# Patient Record
Sex: MALE | Race: WHITE | Employment: OTHER | URBAN - METROPOLITAN AREA
[De-identification: names, ages, dates, MRNs, and addresses within clinical notes are randomized per-mention and may not be internally consistent; named-entity substitution may affect disease eponyms.]

---

## 2021-04-14 ENCOUNTER — HOSPITAL ENCOUNTER (INPATIENT)
Age: 21
LOS: 14 days | Discharge: HOME | DRG: 881 | End: 2021-04-28
Attending: PSYCHIATRY & NEUROLOGY | Admitting: PSYCHIATRY & NEUROLOGY
Payer: OTHER GOVERNMENT

## 2021-04-14 DIAGNOSIS — R45.851 SUICIDAL IDEATION: Primary | ICD-10-CM

## 2021-04-14 PROBLEM — F32.9 MAJOR DEPRESSIVE DISORDER: Status: ACTIVE | Noted: 2021-04-14

## 2021-04-14 LAB
ALBUMIN SERPL-MCNC: 4 G/DL (ref 3.5–5)
ALBUMIN/GLOB SERPL: 1.2 {RATIO} (ref 1.1–2.2)
ALP SERPL-CCNC: 68 U/L (ref 45–117)
ALT SERPL-CCNC: 30 U/L (ref 12–78)
AMPHET UR QL SCN: NEGATIVE
ANION GAP SERPL CALC-SCNC: 4 MMOL/L (ref 5–15)
APPEARANCE UR: CLEAR
AST SERPL W P-5'-P-CCNC: 17 U/L (ref 15–37)
BACTERIA URNS QL MICRO: NEGATIVE /HPF
BARBITURATES UR QL SCN: NEGATIVE
BASOPHILS # BLD: 0 K/UL (ref 0–0.1)
BASOPHILS NFR BLD: 0 % (ref 0–1)
BENZODIAZ UR QL: NEGATIVE
BILIRUB SERPL-MCNC: 0.3 MG/DL (ref 0.2–1)
BILIRUB UR QL: NEGATIVE
BUN SERPL-MCNC: 12 MG/DL (ref 6–20)
BUN/CREAT SERPL: 18 (ref 12–20)
CA-I BLD-MCNC: 8.8 MG/DL (ref 8.5–10.1)
CANNABINOIDS UR QL SCN: NEGATIVE
CHLORIDE SERPL-SCNC: 108 MMOL/L (ref 97–108)
CO2 SERPL-SCNC: 28 MMOL/L (ref 21–32)
COCAINE UR QL SCN: NEGATIVE
COLOR UR: NORMAL
COVID-19 RAPID TEST, COVR: NOT DETECTED
CREAT SERPL-MCNC: 0.68 MG/DL (ref 0.7–1.3)
DIFFERENTIAL METHOD BLD: ABNORMAL
DRUG SCRN COMMENT,DRGCM: NORMAL
EOSINOPHIL # BLD: 0.1 K/UL (ref 0–0.4)
EOSINOPHIL NFR BLD: 1 % (ref 0–7)
ERYTHROCYTE [DISTWIDTH] IN BLOOD BY AUTOMATED COUNT: 13.8 % (ref 11.5–14.5)
ETHANOL SERPL-MCNC: <4 MG/DL
GLOBULIN SER CALC-MCNC: 3.4 G/DL (ref 2–4)
GLUCOSE SERPL-MCNC: 87 MG/DL (ref 65–100)
GLUCOSE UR STRIP.AUTO-MCNC: NEGATIVE MG/DL
HCT VFR BLD AUTO: 35.8 % (ref 36.6–50.3)
HGB BLD-MCNC: 11.6 G/DL (ref 12.1–17)
HGB UR QL STRIP: NEGATIVE
IMM GRANULOCYTES # BLD AUTO: 0 K/UL (ref 0–0.04)
IMM GRANULOCYTES NFR BLD AUTO: 0 % (ref 0–0.5)
KETONES UR QL STRIP.AUTO: NEGATIVE MG/DL
LEUKOCYTE ESTERASE UR QL STRIP.AUTO: NEGATIVE
LYMPHOCYTES # BLD: 1.5 K/UL (ref 0.8–3.5)
LYMPHOCYTES NFR BLD: 25 % (ref 12–49)
MCH RBC QN AUTO: 26.7 PG (ref 26–34)
MCHC RBC AUTO-ENTMCNC: 32.4 G/DL (ref 30–36.5)
MCV RBC AUTO: 82.5 FL (ref 80–99)
METHADONE UR QL: NEGATIVE
MONOCYTES # BLD: 0.4 K/UL (ref 0–1)
MONOCYTES NFR BLD: 7 % (ref 5–13)
NEUTS SEG # BLD: 4.2 K/UL (ref 1.8–8)
NEUTS SEG NFR BLD: 67 % (ref 32–75)
NITRITE UR QL STRIP.AUTO: NEGATIVE
OPIATES UR QL: NEGATIVE
PCP UR QL: NEGATIVE
PH UR STRIP: 7 [PH] (ref 5–8)
PLATELET # BLD AUTO: 212 K/UL (ref 150–400)
PMV BLD AUTO: 10.1 FL (ref 8.9–12.9)
POTASSIUM SERPL-SCNC: 3.7 MMOL/L (ref 3.5–5.1)
PROT SERPL-MCNC: 7.4 G/DL (ref 6.4–8.2)
PROT UR STRIP-MCNC: NEGATIVE MG/DL
RBC # BLD AUTO: 4.34 M/UL (ref 4.1–5.7)
RBC #/AREA URNS HPF: NORMAL /HPF (ref 0–5)
SODIUM SERPL-SCNC: 140 MMOL/L (ref 136–145)
SP GR UR REFRACTOMETRY: 1.01 (ref 1–1.03)
SPECIMEN SOURCE: NORMAL
UA: UC IF INDICATED,UAUC: NORMAL
UROBILINOGEN UR QL STRIP.AUTO: 0.1 EU/DL (ref 0.1–1)
WBC # BLD AUTO: 6.3 K/UL (ref 4.1–11.1)
WBC URNS QL MICRO: NORMAL /HPF (ref 0–4)

## 2021-04-14 PROCEDURE — 65270000029 HC RM PRIVATE

## 2021-04-14 PROCEDURE — 74011250637 HC RX REV CODE- 250/637: Performed by: PSYCHIATRY & NEUROLOGY

## 2021-04-14 PROCEDURE — 81001 URINALYSIS AUTO W/SCOPE: CPT

## 2021-04-14 PROCEDURE — 36415 COLL VENOUS BLD VENIPUNCTURE: CPT

## 2021-04-14 PROCEDURE — 80307 DRUG TEST PRSMV CHEM ANLYZR: CPT

## 2021-04-14 PROCEDURE — 99285 EMERGENCY DEPT VISIT HI MDM: CPT

## 2021-04-14 PROCEDURE — 87635 SARS-COV-2 COVID-19 AMP PRB: CPT

## 2021-04-14 PROCEDURE — 82077 ASSAY SPEC XCP UR&BREATH IA: CPT

## 2021-04-14 PROCEDURE — 80053 COMPREHEN METABOLIC PANEL: CPT

## 2021-04-14 PROCEDURE — 85025 COMPLETE CBC W/AUTO DIFF WBC: CPT

## 2021-04-14 RX ORDER — ACETAMINOPHEN 325 MG/1
650 TABLET ORAL
Status: DISCONTINUED | OUTPATIENT
Start: 2021-04-14 | End: 2021-04-28 | Stop reason: HOSPADM

## 2021-04-14 RX ORDER — ADHESIVE BANDAGE
30 BANDAGE TOPICAL DAILY PRN
Status: DISCONTINUED | OUTPATIENT
Start: 2021-04-14 | End: 2021-04-28 | Stop reason: HOSPADM

## 2021-04-14 RX ORDER — HYDROXYZINE 50 MG/1
50 TABLET, FILM COATED ORAL
Status: DISCONTINUED | OUTPATIENT
Start: 2021-04-14 | End: 2021-04-19

## 2021-04-14 RX ORDER — TRAZODONE HYDROCHLORIDE 50 MG/1
50 TABLET ORAL
Status: DISCONTINUED | OUTPATIENT
Start: 2021-04-14 | End: 2021-04-19

## 2021-04-14 RX ADMIN — HYDROXYZINE HYDROCHLORIDE 50 MG: 50 TABLET, FILM COATED ORAL at 20:51

## 2021-04-14 RX ADMIN — TRAZODONE HYDROCHLORIDE 50 MG: 50 TABLET ORAL at 20:51

## 2021-04-14 NOTE — BH NOTES
This 24 year old caucasin male is being admitted to [address] [place] [address] under the professional sertvices of Aiden Chavez with an admitting diagnosis of major Depression with suicidal ideations. 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 Best he could rate sleep hours was about a three hours. Verbalizes that he went to his platXoomsysant and told him he was feeling suicidal with a plan to hang self and was bought to our ER. Feels paronoid and has racing thoughts at times. Denies any substance abuse issues. Reports that he was feeling suicidal this morning but not right now. Client will be plaed on close observation for safety.
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
## 2021-04-14 NOTE — ROUTINE PROCESS
TRANSFER - OUT REPORT: 
 
Verbal report given to Antonio Mesa [id number] (name) on Thelma Elkins  being transferred to [address] [place] [address] (unit) for routine progression of care Report consisted of patients Situation, Background, Assessment and  
Recommendations(SBAR). Information from the following report(s) SBAR, ED Summary, MAR, Recent Results and Med Rec Status was reviewed with the receiving nurse. Lines:    
 
Opportunity for questions and clarification was provided. Patient transported with: 
 [employer]
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
## 2021-04-14 NOTE — ED TRIAGE NOTES
Pt states he attempted to hang himself 2 days pta and told his platoon sgt who brought him in today, pt reports current SI, denies HI, denies drug/alcohol use

## 2021-04-14 NOTE — BSMART NOTE
1150 [facility] [address] INTAKE ASSESSMENT Pt awake alert sitting up in the chair watching TV. Pt states he is feeling better denies SI HI Hallucinations at this time. BED SEARCH [facility] No male beds [facility] faxed [facility] N/A message left [facility] can not accommodate pt at this time

## 2021-04-15 PROCEDURE — 65220000003 HC RM SEMIPRIVATE PSYCH

## 2021-04-15 PROCEDURE — 74011250637 HC RX REV CODE- 250/637: Performed by: PSYCHIATRY & NEUROLOGY

## 2021-04-15 RX ADMIN — TRAZODONE HYDROCHLORIDE 50 MG: 50 TABLET ORAL at 21:05

## 2021-04-15 NOTE — BH NOTES
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 Pt reported depression and suicidal thoughts with plan to hang himself. Pt expressed anxiety and paranoia as well. Pt stated his reasons for depression and SI is due to the likelihood of him being dismissed from the [employer]. Pt stated that the doctor at [facility] doesn't believe he is fit for duty, and pt stated \"and I agree\". Pt stated the possibility of this taking place has \"put a lot of stress on me\" because he is unsure if it will be a medical discharge or not. 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 Do emotions interfere with leisure activities / lifestyles:  Yes    When engaging in leisure activities, do you forget worries:  Sometimes    Do you belong to a Anabaptist:  No    Are you active in CENTERSONIC activities:  No    Typical Day:  Pt reported he wakes up, does pt, cleans his room, eats, and has his free time.      Strengths:  Finances, Employment, Housing and Providers    Limitations / Barriers:  Family Support, Social Support, Energy, Motivation, Depressed Mood, Anxiety, Sleep, Concentration, Paranoia and Anger / Aggression    Treatment Modalities:  Stress Management, Coping Skills, Symptom Recognition, Healthy Thinking, Mood Management and Leisure Skills    Patient Educational  Needs:  Skills to recognize and challenge problematic thinking, Identify positive coping strategies and skills to manage symptoms or moods, Leisure education and Recognition of symptoms and signs    Focus of Treatment:  Introduce positive outlets for self expression and Introduce and encourage the exploration of alternative coping skills    Summary:  Pt was cooperative during assessment. Pt reported [place] [facility]. Melanie Bauer. Pt reported his most recent hospital stay was at [facility], where he states he just left from. Pt was presenting flat, depressed, very soft spoken and took a few seconds before answering each question, but was forthcoming with information. Pt reported he sees a psychiatrist, Dr. Martell Johnson at [facility]. Pt stated his goal for treatment is \"being better at coping skills\".
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
## 2021-04-15 NOTE — PROGRESS NOTES
Problem: Depressed Mood (Adult/Pediatric)  Goal: *STG: Remains safe in hospital  Outcome: Progressing Towards Goal  Goal: *STG: Complies with medication therapy  Outcome: Progressing Towards Goal     Problem: Suicide  Goal: *STG: Remains safe in hospital  Outcome: Progressing Towards Goal  Goal: *STG/LTG: Complies with medication therapy  Outcome: Progressing Towards Goal     Problem: Depressed Mood (Adult/Pediatric)  Goal: *STG: Attends activities and groups  Outcome: Not Progressing Towards Goal     Problem: Suicide  Goal: *STG: Seeks staff when feelings of self harm or harm towards others arise  Outcome: Not Progressing Towards Goal

## 2021-04-15 NOTE — BH NOTES
Behavioral Health Treatment Team Note     Patient goal(s) for today: declined to answer  Treatment team focus/goals: PSA, medication management, group therapy, collateral    Progress note: Pt laying in bed, difficulty staying awake, answering questions keeping eyes open. Pt reported he has not been able to sleep and declined PSA at this time. Writer will continue to attempt PSA.      LOS:  1  Expected LOS: 5-7    Insurance info/prescription coverage:  [facility]  Date of last family contact:  None at this time  Family requesting physician contact today:  no  Discharge plan:  Writer will address DC plans with pt after PSA completed  Guns in the home:  unknown   Outpatient provider(s):  Possibly Iva Coughlin, will determine when PSA is completed    Participating treatment team members: Aysha Perera, * (assigned SW), Reji Bearden LMSW

## 2021-04-15 NOTE — GROUP NOTE
MICHELL  GROUP DOCUMENTATION INDIVIDUAL Group Therapy Note Date: 4/15/2021 Group Start Time: 1500 Group End Time: 9242 Group Topic: Process Group - [facility] Juan Carlos GALARZA  GROUP DOCUMENTATION GROUP Group Therapy Note Attendees: All pts were encouraged to attend however only 5 out of 13 attended. The pts were asked to introduce themselves and then to state something positive that has happened to them recently. We then discussed coping mechanisms and passed around the positivity ball. Attendance: Did not attend Ewelina Maurice

## 2021-04-15 NOTE — BH NOTES
PSA ATTEMPT      Writer introduced herself as pt's  and therapist. Pt laying in bed while speaking, difficulty staying awak, answering questions and keeping eyes open. Pt reported he has not been able to sleep. Writer asked if pt felt up to answering some questions and pt requested writer return at later time. Writer will continue to f/u as needed.

## 2021-04-15 NOTE — BH NOTES
Patient presents with a flat affect. Quiet on the unit. Pleasant & cooperative. Attends groups. Stated that he wants out of the [employer] & wants to return home. Presently sitting in the dayroom with peers. Denies SI/HI. Denies AVH. No physical complaints voiced. Remains on CO. Continue to monitor.

## 2021-04-15 NOTE — GROUP NOTE
[facility] GROUP DOCUMENTATION INDIVIDUAL Group Therapy Note Date: 4/15/2021 Group Start Time: 1100 Group End Time: 2594 Group Topic: Education Group - Inpatient [facility] Group [facility] GROUP DOCUMENTATION GROUP Group Therapy Note Attendees: All pts were encouraged to come but only 9 out of 13 pts attended. The topic was trauma and the pts were given two worksheets revolving around trauma and common reactions to trauma. The pts were asked to read aloud and to comment on what they related to. The conversations revolved around trauma reactions and coping and how ppl expect you to just get over it as well as the importance of talking about it instead of ignoring it. They were given a trigger warning at the beginning and in the end it was offered to stick around for breathing exercises. Attendance: Attended Patient's Goal:  To attend groups and activities Interventions/techniques: Challenged, Informed, Validated, Promoted peer support, Provide feedback and Supported Follows Directions: Followed directions Interactions: Interacted appropriately Mental Status: Calm and Congruent Behavior/appearance: Attentive, Cooperative and Withdrawn/quiet Goals Achieved: Able to listen to others Additional Notes:  Pt came in late and did not participate but listened Tawana Lawrence

## 2021-04-15 NOTE — BH NOTES
Pt very withdrawn, stays in bed all shift. Affect is flat, no emotion noted. Pt barely answers questions. He was med compliant. He was given trazodone to help sleeping as his complaints were that he had not been sleeping well due to nightmares an anxiety. Pt talks very softly and difficult to understand. Ice water was given and snack was offered but patient refused food. Pt has been sleeping all night with no distress noted. Respirations have been regular and unlabored. Will continue to monitor patient every 15 mins as per unit.

## 2021-04-16 PROCEDURE — 65220000003 HC RM SEMIPRIVATE PSYCH

## 2021-04-16 PROCEDURE — 74011250637 HC RX REV CODE- 250/637: Performed by: PSYCHIATRY & NEUROLOGY

## 2021-04-16 RX ORDER — BUPROPION HYDROCHLORIDE 75 MG/1
75 TABLET ORAL DAILY
Status: DISCONTINUED | OUTPATIENT
Start: 2021-04-17 | End: 2021-04-18

## 2021-04-16 RX ADMIN — TRAZODONE HYDROCHLORIDE 50 MG: 50 TABLET ORAL at 21:48

## 2021-04-16 RX ADMIN — HYDROXYZINE HYDROCHLORIDE 50 MG: 50 TABLET, FILM COATED ORAL at 14:22

## 2021-04-16 NOTE — GROUP NOTE
[facility] GROUP DOCUMENTATION INDIVIDUAL Group Therapy Note Date: 4/16/2021 Group Start Time: 1100 Group End Time: 3109 Group Topic: Education Group - Inpatient [facility] Group Malachi [place] [facility] GROUP DOCUMENTATION GROUP Group Therapy Note Attendees: All pts were encouraged to attend but only 10 out of 14 came. The topic was deep breathing. As a group we read through the worksheet and talked about what deep breathing was and what its benefits are. We then practiced deep breathing as a group and the pts discussed how they felt afterwards. Attendance: Attended Patient's Goal:  To attend groups and activities Interventions/techniques: Challenged, Informed, Validated, Promoted peer support, Provide feedback and Supported Follows Directions: Followed directions Interactions: Interacted appropriately Mental Status: Calm, Congruent and Flat Behavior/appearance: Attentive, Cooperative and Withdrawn/quiet Goals Achieved: Able to listen to others and Able to self-disclose Additional Notes:  Pt did not participate much. He reported feeling anxious and that when he practiced deep breathing in the past it did not always work for him. Lou Juan F

## 2021-04-16 NOTE — PROGRESS NOTES
Spiritual Care Assessment/Progress Note  [facility]      NAME: Eva Bazan      MRN: [id number]  AGE: 24 y.o.  SEX: male  Restorationist Affiliation: No preference   Language: English     4/16/2021     Total Time (in minutes): 20     Spiritual Assessment begun in [facility] 2  NON ACUTE through conversation with:         [x]Patient        [] Family    [] Friend(s)        Reason for Consult: Request by patient     Spiritual beliefs: (Please include comment if needed)     [] Identifies with a elzbieta tradition:         [] Supported by a elzbieta community:            [x] Claims no spiritual orientation:           [] Seeking spiritual identity:                [] Adheres to an individual form of spirituality:           [] Not able to assess:                           Identified resources for coping:      [] Prayer                               [] Music                  [] Guided Imagery     [] Family/friends                 [] Pet visits     [] Devotional reading                         [x] Unknown     [] Other:                                             Interventions offered during this visit: (See comments for more details)    Patient Interventions: Affirmation of emotions/emotional suffering, Catharsis/review of pertinent events in supportive environment, Coping skills reviewed/reinforced, Initial/Spiritual assessment, patient floor, Normalization of emotional/spiritual concerns           Plan of Care:     [] Support spiritual and/or cultural needs    [] Support AMD and/or advance care planning process      [] Support grieving process   [] Coordinate Rites and/or Rituals    [] Coordination with community clergy   [] No spiritual needs identified at this time   [] Detailed Plan of Care below (See Comments)  [] Make referral to Music Therapy  [] Make referral to Pet Therapy     [] Make referral to Addiction services  [] Make referral to [facility]  [] Make referral to Spiritual Care Partner  [] No future visits requested        [x] Follow up upon further referrals     Comments:  responded to patient request for visit.  and patient engaged in conversation re patient's suicide attempts, his unhappiness in the [facility], and his sense of being unhappy and lost. Patient and  engaged in patient's concerns re God's forgiveness.  assured patient of God's caring about him and laura and forgiveness is God's business.  and patient also reflected on coping skills, patient indicated he cannot always use his coping skills and gave video games as one example of a coping skill. Pt is hopeful to get out of the [facility].  and patient explored hopes and what may bring patient happiness/contentment. Patient indicated he wants to help people and that is why he wanted to be a  in [facility] but he was rejected.  encouraged patient to not give up on his goal of finding a purpose and vocation in life that will provided contentment. Patient expressed thankfulness for visit.  advised patient of chaplains availability for follow up upon further referrals.      Visited by Heidi Aragon, [address], [facility]    can be contacted by calling  and requesting  on call

## 2021-04-16 NOTE — BH NOTES
PSYCHOSOCIAL ASSESSMENT  :Patient identifying info:   Shellie Bradley is a 24 y.o., male admitted 4/14/2021 11:37 AM     Presenting problem and precipitating factors:   Pt stated that he has been suicidal and depressed since middle school. He has attempted suicide in middle school and high school and the last attempt in high school was the closest he's even got to succeeding. 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 He wanted to join the [employer] but didn't pass the test. He now wants out and thinks the [employer] is making him worse. Prior to joining [employer] he did 3 [employer]. He feels like he has failed and made the wrong move. He feels like the real him is trapped inside and can't get out. He stated that at first he was ok in the [employer] and then he kept getting into fights and blacked out during one and had a flashback of a previous attempt and then it started up again. He reported being on trazedone and an anti depressant that he couldn't name that he believes put him in depressive episodes and led to the last attempt. He left [facility] twoThursdays ago and then attempted suicide on Monday and told his medical officer a [facility] and she got him here. He reported that his safety plan helped hm with his last attempt. He also stated that he couldn't find a place to hang himself so he gave up. He rated his current CI an 8/10 but contracted for safety. Writer let the nurses know. He stated that he currently has no plan while at hospital but would if he were home. He also reported having a dream last night that he jumped off a bridge. He reported thinking about suicide at least once or twice a day. Mental status assessment:   He presented w a flat affect. He did not appear to be responding to internal stimuli. He was tearful when talking about his suicide attempts. He was soft spoken. His speech and thought process and content was organized.      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 Collateral information:   Pt declined bc he does not want them knowing what is going on with him     Current psychiatric /substance abuse providers and contact info:   Pt reported seeing medical officer Rasheeda Phan at [facility]    Previous psychiatric/substance abuse providers and response to treatment:   Pt reported [facility] at [facility]     Family history of mental illness or substance abuse:   Pt reported none that he is aware of    Substance abuse history:  Pt reported none       Social History     Tobacco Use    Smoking status: Never Smoker   Substance Use Topics    Alcohol use: Not on file       History of biomedical complications associated with substance abuse :  Pt reported none     Patient's current acceptance of treatment or motivation for change:  Pt seems motivated to get out of [employer] and wants to figure out why he keeps having SI    Family constellation:   Pt reported having a mom and dad back in 1983 [facility]. He said he has not been very close to them the past 6 months bc he hasn't seen them and tends to ignore them when they reach out    Is significant other involved?    Pt reported being single     Describe support system:   Pt reported talking to his cousin who lives in [place] and who was in the [employer] for 28 years, but they have never met in person    Describe living arrangements and home environment:  [place] [facility] Group issues:   Pt reported none     Hospital Problems  Never Reviewed          Codes Class Noted POA    Suicidal ideation ICD-10-CM: R45.851  ICD-9-CM: V62.84  4/14/2021 Unknown        Major depressive disorder ICD-10-CM: F32.9  ICD-9-CM: 296.20  4/14/2021 Unknown              Trauma history:   Pt reported being bullied a lot to the point of having SI    Legal issues:   Pt reported none    History of  service:   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 He reported abandoning all his friends and the ones [place] base he does not talk to about his problems.  However, they did find out [facility]    Describe coping skills:  Pt reported playing games     [email or web address]  2021

## 2021-04-16 NOTE — GROUP NOTE
[facility] GROUP DOCUMENTATION INDIVIDUAL Group Therapy Note Date: 4/16/2021 Group Start Time: 1500 Group End Time: 7863 Group Topic: Process Group - Inpatient [facility] Group Shelba Rater [facility] GROUP DOCUMENTATION GROUP Group Therapy Note Attendees: All pts were encouraged to attend but only 5 out of 10 came. All pts were asked to introduce themselves and to state something positive that has happened recently. We then passed around the anger ball and focused on anger management. Everyone stated they enjoyed the activity. Attendance: Attended Patient's Goal:  To attend groups and activities Interventions/techniques: Challenged, Informed, Validated, Promoted peer support, Provide feedback and Supported Follows Directions: Followed directions Interactions: Interacted appropriately Mental Status: Calm, Congruent and Flat Behavior/appearance: Attentive and Cooperative Goals Achieved: Able to engage in interactions, Able to listen to others and Able to self-disclose Additional Notes:  Pt came in late. He talked about how weather affects your mood and the color red reminding us of anger and being threatened by someone and reacting poorly. Heaven Lock

## 2021-04-16 NOTE — BH NOTES
Dx: Depression w/SI    Affect restricted to flat; however, denied having thoughts to do self harm. Poor eye to eye contact. Agreed to let staff know if he has SI. Cont to say he would like to get out of the [facility]. Currently not on scheduled meds. Minimal interactions. Attended to hygiene and dressed appropriately. Encouraged to attend groups. Q 15 mins checks maintained, for safety. 1424 Vistaril 50 mg, given p o, for anxiety level of 8/10. Said he was tearful during his assessment with the CM. Agreed to let staff know if he has thoughts to self harm. Q 15 mins checks maintained. Attended some groups.

## 2021-04-16 NOTE — BH NOTES
FIREARM ASSESSMENT    Pt denied access to firearms. He stated that he used to have access during training.

## 2021-04-16 NOTE — PROGRESS NOTES
Problem: Depressed Mood (Adult/Pediatric)  Goal: *STG: Verbalizes anger, guilt, and other feelings in a constructive manor  Outcome: Progressing Towards Goal     Problem: Depressed Mood (Adult/Pediatric)  Goal: *STG: Remains safe in hospital  Outcome: Progressing Towards Goal     Problem: Depressed Mood (Adult/Pediatric)  Goal: *STG: Complies with medication therapy  Outcome: Progressing Towards Goal     Patient was able to express his feelings. Patient has remained safe so far this shift. Patient was medication compliant. Patient remains on close observation. Patient has been alert, oriented, cooperative and pleasant on approach. Patient has been slightly withdrawn. He has a flat affect. Patient has admitted to depression. Patient has not voiced any SI or HI. Medication compliant with PRN Trazodone. Continue to assess. Since going to bed, patient has been laying down quietly with his eyes closed.

## 2021-04-17 PROCEDURE — 74011250637 HC RX REV CODE- 250/637: Performed by: PSYCHIATRY & NEUROLOGY

## 2021-04-17 PROCEDURE — 65220000003 HC RM SEMIPRIVATE PSYCH

## 2021-04-17 RX ADMIN — TRAZODONE HYDROCHLORIDE 50 MG: 50 TABLET ORAL at 21:31

## 2021-04-17 RX ADMIN — BUPROPION HYDROCHLORIDE 75 MG: 75 TABLET, FILM COATED ORAL at 09:06

## 2021-04-17 RX ADMIN — HYDROXYZINE HYDROCHLORIDE 50 MG: 50 TABLET, FILM COATED ORAL at 23:42

## 2021-04-17 NOTE — PROGRESS NOTES
Progress Note  Date:2021       Room:[facility]  Patient Name:Guy Poole     YOB: 2000     Age:20 y.o. Subjective    Subjective  Patient reports he feels the same from the previous day. He reports still depressed having suicidal ideations. He states he stresses relating to debating whether he is going to stay in the [employer] or not. He presents with restricted affect. She denied any active intent or plan to harm himself today. Denies any hallucinations. He was able to share briefly but remains to himself. Mental status examination-patient is alert oriented to name place person, restricted affect quiet reports feeling depressed suicidal but no plan or intent. He states that he feels the same from previous day. But is able to process and share briefly. Insight judgment coping remains limited  Review of Systems  Objective         Vitals Last 24 Hours:  TEMPERATURE:  Temp  Av.7 °F (37.1 °C)  Min: 98.7 °F (37.1 °C)  Max: 98.7 °F (37.1 °C)  RESPIRATIONS RANGE: Resp  Av  Min: 16  Max: 16  PULSE OXIMETRY RANGE: No data recorded  PULSE RANGE: Pulse  Av  Min: 67  Max: 67  BLOOD PRESSURE RANGE: Systolic (67VRJ), BZZ:659 , Min:107 , MYW:244   ; Diastolic (45YON), PUL:71, Min:56, Max:56    I/O (24Hr): No intake or output data in the 24 hours ending 21 1322  Objective  Labs/Imaging/Diagnostics    Labs:  CBC:No results for input(s): WBC, RBC, HGB, HCT, MCV, RDW, PLT, HGBEXT, HCTEXT, PLTEXT in the last 72 hours. CHEMISTRIES:No results for input(s): NA, K, CL, CO2, BUN, CA, PHOS, MG in the last 72 hours. No lab exists for component: CREATININE, GLUCOSEPT/INR:No results for input(s): INR, INREXT in the last 72 hours. No lab exists for component: PROTIME  APTT:No results for input(s): APTT in the last 72 hours. LIVER PROFILE:No results for input(s): AST, ALT in the last 72 hours.     No lab exists for component: BILIDIR, BILITOT, [facility]  Lab Results   Component Value Date/Time    ALT (SGPT) 30 04/14/2021 12:48 PM    AST (SGOT) 17 04/14/2021 12:48 PM    Alk. phosphatase 68 04/14/2021 12:48 PM    Bilirubin, total 0.3 04/14/2021 12:48 PM       Imaging Last 24 Hours:  No results found. Assessment//Plan   Active Problems:    Suicidal ideation (4/14/2021)      Major depressive disorder (4/14/2021)      Assessment & Plan  Continue current medications  Provided support psychoeducation  Encourage participation in therapy.       Current Facility-Administered Medications:     buPROPion (WELLBUTRIN) tablet 75 mg, 75 mg, Oral, DAILY, Kathryn BLANK MD, 75 mg at 04/17/21 0906    hydrOXYzine HCL (ATARAX) tablet 50 mg, 50 mg, Oral, TID PRN, Kathryn BLANK MD, 50 mg at 04/16/21 1422    traZODone (DESYREL) tablet 50 mg, 50 mg, Oral, QHS PRN, Margarito Curiel MD, 50 mg at 04/16/21 2148    acetaminophen (TYLENOL) tablet 650 mg, 650 mg, Oral, Q4H PRN, Inderjit Llanes MD    magnesium hydroxide (MILK OF MAGNESIA) 400 mg/5 mL oral suspension 30 mL, 30 mL, Oral, DAILY PRN, Margarito Curiel MD  Electronically signed by Debra Kwok MD on 4/17/2021 at 1:22 PM

## 2021-04-17 NOTE — GROUP NOTE
IP  GROUP DOCUMENTATION INDIVIDUAL Group Therapy Note Date: 4/17/2021 Group Start Time: 1300 Group End Time: 1265 Group Topic: Process Group - Inpatient [facility] 2 BH NON ACUTE [facility]  GROUP DOCUMENTATION GROUP Group Therapy Note Attendees: 7 Pt's were invited to process group where they were encouraged to participate in discussions and process their own thoughts and feelings. Pt's were encouraged to support each other and provide feedback when appropriate. They were encouraged to be respectful of each other, follow the group rules and open up in a group setting. Attendance: Attended Patient's Goal: Pt will participate in process group to appropriately process their thoughts and feelings while providing support to their peers. Interventions/techniques: Challenged, Informed, Promoted peer support and Supported Follows Directions: Followed directions Interactions: Interacted appropriately Mental Status: Calm Behavior/appearance: Withdrawn/quiet Goals Achieved: Able to listen to others Additional Notes:  Pt attended this group session and appeared attentive but also appeared somewhat withdrawn. He did not participate verbally.   
 
Timbo Bliss, [address] [facility], [address] [place] [address]

## 2021-04-17 NOTE — BH NOTES
Behavioral Health Treatment Team Note     Patient goal(s) for today: 'go to one group'  Treatment team focus/goals: group therapy, medication management     Progress note: Pt presented with a flat affect and depressed mood. Pt stated that he was feeling a little bit down today. Pt endorsed SI stating that he had a plan to jump off a bridge when he goes home. Pt was able to contract verbally for safety with this writer. Pt stated that he would tell staff if he wanted to hurt himself. Pt rated his anxiety a 0/10 and rated his depression a 7/10. Pt denies any HI/AVH. Pt in need of inpatient level of care due to continued SI with a plan.      LOS:  3  Expected LOS: 5-7    Insurance info/prescription coverage:  [facility]      Date of last family contact:  None yet   Family requesting physician contact today:  no  Discharge plan:  Pt and therapist will work together to determine [address] [facility] in the home:  no   Outpatient provider(s):  [facility]    Participating treatment team members: Carie Neri, * (assigned SW), [employer]

## 2021-04-17 NOTE — PROGRESS NOTES
Problem: Depressed Mood (Adult/Pediatric)  Goal: *STG: Verbalizes anger, guilt, and other feelings in a constructive manor  Outcome: Progressing Towards Goal     Problem: Depressed Mood (Adult/Pediatric)  Goal: *STG: Remains safe in hospital  Outcome: Progressing Towards Goal     Problem: Depressed Mood (Adult/Pediatric)  Goal: *STG: Complies with medication therapy  Outcome: Progressing Towards Goal     Patient has been able to share her feelings. Patient has remained safe so far this shift. Patient was medication compliant. Patient remains on close observation. Patient has been oriented, cooperative and pleasant when awakened. Patient has mostly been laying in bed with his eyes closed. Patient has a flat affect. He received PRN Trazodone. Continue to assess. Since going to bed, patient has been laying down quietly with his eyes closed.

## 2021-04-17 NOTE — BH NOTES
Patient seen for follow-up patient in bed isolated depressed suicidal not suicidal today able to find out that he was taking Zoloft made him suicidal we will not give any Zoloft or try Wellbutrin 75 mg daily gross gradually adjust his isolated depressed low energy.   Vital signs today temperature 98.7 pulse 67 blood pressure 101 56 respirations 16 continue inpatient level of care indicated I went ahead and started Wellbutrin 75 mg daily had a few sessions added meeting with messages she had new information will go and start of an antidepressant thank

## 2021-04-17 NOTE — BH NOTES
Pt.is up and visible on unit, affect is flat, appetite good, appearance is neat, denies feeling suicidal, mood is depressed, pt. Isolates to room upon occasion. low energy level, no other concerns voiced, remains on close observation.

## 2021-04-18 PROCEDURE — 65220000003 HC RM SEMIPRIVATE PSYCH

## 2021-04-18 PROCEDURE — 74011250637 HC RX REV CODE- 250/637: Performed by: PSYCHIATRY & NEUROLOGY

## 2021-04-18 RX ORDER — BUPROPION HYDROCHLORIDE 100 MG/1
100 TABLET ORAL DAILY
Status: DISCONTINUED | OUTPATIENT
Start: 2021-04-19 | End: 2021-04-22

## 2021-04-18 RX ADMIN — TRAZODONE HYDROCHLORIDE 50 MG: 50 TABLET ORAL at 20:03

## 2021-04-18 RX ADMIN — BUPROPION HYDROCHLORIDE 75 MG: 75 TABLET, FILM COATED ORAL at 09:01

## 2021-04-18 NOTE — BH NOTES
Pt.has been lying down in bed most of am shift with staff at bedside. Remains withdrawn,quiet ,isolative, denies suicidal ideation at present. appears preoccupied. no other concerns voiced, remains on 1to 1 supervision for suicidal ideations.

## 2021-04-18 NOTE — GROUP NOTE
IP  GROUP DOCUMENTATION INDIVIDUAL Group Therapy Note Date: 4/18/2021 Group Start Time: 1100 Group End Time: 1200 Group Topic: Education Group - Inpatient [facility] 2  NON ACUTE [facility] GROUP DOCUMENTATION GROUP Group Therapy Note Facilitated group discussion focused on cognitive distortions and how automatic negative thoughts (A.N.T.s) can be challenged and turned into more optimistic thoughts Attendees: 7/10 Attendance: Attended Patient's Goal:  *STG: Attends activities and groups Interventions/techniques: Challenged, Informed and Supported Follows Directions: Followed directions Interactions: Other Pt did not interact Mental Status: Calm, Depressed, Flat and Restricted Behavior/appearance: Attentive, Cooperative, Needed prompting and Withdrawn/quiet Goals Achieved: Able to listen to others Additional Notes:  Pt was receptive to intervention discussion. Pt arrived to group late and accepted intervention worksheets. Pt declined to share or interact in group but sat quietly and listened to the discussion. Tonya Danielle, RT Intern

## 2021-04-18 NOTE — PROGRESS NOTES
Problem: Depressed Mood (Adult/Pediatric)  Goal: *STG: Verbalizes anger, guilt, and other feelings in a constructive manor  Outcome: Progressing Towards Goal     Problem: Depressed Mood (Adult/Pediatric)  Goal: *STG: Remains safe in hospital  Outcome: Progressing Towards Goal     Problem: Depressed Mood (Adult/Pediatric)  Goal: *STG: Complies with medication therapy  Outcome: Progressing Towards Goal     Patient was able to express his feelings. Patient has remained safe so far this shift. Patient gave a short's waistband string to staff that he tied in a slipknot on one side. Patient was medication compliant. Patient was on close observation at the start of the shift. Patient was placed on Q 5 Minute Rounds at 2350 and then on a 1:1 Observations at 0000. Patient has been alert, oriented, cooperative and pleasant. Patient has been up on the unit interacting with peers. He has been playing card game with his peers. 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 He said he felt \"trapped\" and  \"Stuck. \"  He said he was in a dilemma of either staying in the [employer] or leaving the [facility]. He stated that the discharge he expects to receive will hamper his future in working and the possibility of joining the [employer] later on. He said he wants to go home and see his family. He said he was getting an \"Administrative Discharge. \"  He said he has dreams about harming himself on [place]. He rated his depression as 7/10. He denied anxiety then later said he is worried about going home. He denied any HI. He had contracted for safety while in the hospital.   He took HS PRN Trazodone. Later patient began walking the unit to \"clear my mind. \"  He was asked several times if he needed anything and he denied the need for assistance  When he asked for water later staff talked to him at length.   Unsolicited, he reached into his pocket and produced a string that he later said he pulled out of his shorts. Staff and the patient talked about several issues:  Depression, loss, meaning, SI, purpose, future possibilities. Patient was made to empty his pockets to see if he had anything else to harm himself. [de-identified] male staff members searched patient's room for other potentially dangerous items and none were found. Patient was placed on Q 5 minute rounds at 2350 checks until a 1:1 (order received at 0000) staff person could be found by the supervisor. The 1:1 staff arrived to [address] [place] [address] at [address] [facility]. Continue to assess. 0200 1:1 Nursing Note  Patient has been laying down with his eyes closed. 1:1 staff has been present. Patient has not displayed any overt self-harm behaviors. 0400 1:1 Nursing Note  Patient has been laying down quietly with his eyes closed. 1:1 staff has been present. Patient has not exhibited any self-harm behaviors.

## 2021-04-18 NOTE — PROGRESS NOTES
Progress Note  Date:2021       Room:[facility]  Patient Name:Guy Mooney     YOB: 2000     Age:20 y.o. Subjective    Subjective   Patient presents depressed withdrawn to himself isolates. He had expressed suicidal ideations with a plan last night and had strains from his shorts that he wanted to use last evening. Patient was placed on one-to-one and was further processed with staff. 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 He states still the thoughts comes and goes but he does not have any plan and intent and agrees and states that he would seek help when he feels having thoughts of self-harm or suicide. Patient states he feels safe. His case management continue to process with him and work on a safety plan. Patient is agreeable. Mental status examination-patient is alert oriented x3 presents depressed restricted affect reports he he is debating between staying in the [facility] and not staying in her visits and going to impact his future enrollment in [facility] and jobs. He feels well he is ever going to be successful in his life. He does admit to trauma and his growing up but did not elaborate. Currently denies any active intent or plan of suicide  Review of Systems  Objective         Vitals Last 24 Hours:  TEMPERATURE:  Temp  Av.7 °F (37.1 °C)  Min: 98.7 °F (37.1 °C)  Max: 98.7 °F (37.1 °C)  RESPIRATIONS RANGE: Resp  Av  Min: 18  Max: 18  PULSE OXIMETRY RANGE: No data recorded  PULSE RANGE: Pulse  Av  Min: 64  Max: 64  BLOOD PRESSURE RANGE: Systolic (52OYM), VUL:751 , Min:114 , EZR:250   ; Diastolic (69OJZ), ERC:39, Min:65, Max:65    I/O (24Hr):   No intake or output data in the 24 hours ending 21 1126  Objective  Labs/Imaging/Diagnostics    Labs:  CBC:No results for input(s): WBC, RBC, HGB, HCT, MCV, RDW, PLT, HGBEXT, HCTEXT, PLTEXT in the last 72 hours. CHEMISTRIES:No results for input(s): NA, K, CL, CO2, BUN, CA, PHOS, MG in the last 72 hours. No lab exists for component: CREATININE, GLUCOSEPT/INR:No results for input(s): INR, INREXT in the last 72 hours. No lab exists for component: PROTIME  APTT:No results for input(s): APTT in the last 72 hours. LIVER PROFILE:No results for input(s): AST, ALT in the last 72 hours. No lab exists for component: Calin Rosen ALKPHOS  Lab Results   Component Value Date/Time    ALT (SGPT) 30 04/14/2021 12:48 PM    AST (SGOT) 17 04/14/2021 12:48 PM    Alk. phosphatase 68 04/14/2021 12:48 PM    Bilirubin, total 0.3 04/14/2021 12:48 PM       Imaging Last 24 Hours:  No results found. Assessment//Plan   Active Problems:    Suicidal ideation (4/14/2021)      Major depressive disorder (4/14/2021)      Assessment & Plan   Discontinue one-to-one observation this morning. Patient feels safe with working safety planning and is going to contact staff when he feels overwhelmed or has thoughts of self-harm or suicidal ideation. Currently denies any active intent or plan at this time. He does admit to feeling still depressed and having on and off suicidal ideations.   We will increase his Wellbutrin to the next dose      Current Facility-Administered Medications:     buPROPion (WELLBUTRIN) tablet 75 mg, 75 mg, Oral, DAILY, Inderjit Llanes MD, 75 mg at 04/18/21 0901    hydrOXYzine HCL (ATARAX) tablet 50 mg, 50 mg, Oral, TID PRN, Sanjana Arce MD, 50 mg at 04/17/21 2342    traZODone (DESYREL) tablet 50 mg, 50 mg, Oral, QHS PRN, Sanjana Arce MD, 50 mg at 04/17/21 2131    acetaminophen (TYLENOL) tablet 650 mg, 650 mg, Oral, Q4H PRN, Inderjit Llanes MD    magnesium hydroxide (MILK OF MAGNESIA) 400 mg/5 mL oral suspension 30 mL, 30 mL, Oral, DAILY PRN, Sanjana Arce MD    Electronically signed by Sandra Irene MD on 4/18/2021 at 11:26 AM

## 2021-04-18 NOTE — BH NOTES
Behavioral Health Treatment Team Note     Patient goal(s) for today: 'try to stay positive'   Treatment team focus/goals: medication management, group therapy     Progress note: Pt presented with a flat affect and depressed mood. Pts thoughts and speech were clear and organized. Pt was able to safety plan with this writer, see previous note. Pt denies any SI, HI, AVH at this time. Pt stated that he was feeling a 'little bit' of anxiety and depression this morning.  Pt in need of inpatient level of care due to continued SI.     LOS:  4  Expected LOS: 5-7    Insurance info/prescription coverage:  /[facility]HSI [facility]  Date of last family contact:  None yet   Family requesting physician contact today:  no  Discharge plan:  Pt and therapist will work together to determine [address] [place] [address] in the home:  no   Outpatient provider(s):  [facility]     Participating treatment team members: Nina Lopez, * (assigned SW), [employer]

## 2021-04-18 NOTE — BH NOTES
Pt.is up and visible on unit, affect is flat,appetite good,appearance is neat,pt. denies feeling suicidal and has verbally contracted for safety,pt. States he will notify staff if he started to feel suicidal.pt. presently in dayroom sitting with peers. eating lunch,1 to 1 observation discontinued. remains on close observation.

## 2021-04-18 NOTE — BH NOTES
Writer was informed by front unit nurse Deedee, that the pt had turned in a string that he was planning on using to hang himself and that he had been placed on a 1:1 for safety. Writer spoke to pt in order to process the event with him. Pt denied any current SI and reported that he felt better after giving the string to the nurses. Writer emphasized the progress that the pt had made in turing in the string rather than trying to use the string to harm himself. Pt was able to verbally contract for safety with this writer stating that he would inform staff if he was feeling like harming himself. Pt denied any current SI and stated that he felt calm today. Writer spoke with Dr. Elke Sandhu about the pt remaining on the 1:1. Dr. Elke Sandhu stated that he could be taken off of the 1:1 because he was able to contract for safety and denies any SI at this time. Dr. Elke Sandhu requested that this writer complete a suicide safety plan with the pt. This writer was able to complete a safety plan with the pt, the pt then posted the safety plan next to his bed side where he can see it easily. Writer spoke to Nurse Deedee and informed her that Dr. Elke Sandhu has stated that he was able to come off of the 1:1 after he was able to safety plan.

## 2021-04-18 NOTE — GROUP NOTE
[facility] GROUP DOCUMENTATION INDIVIDUAL Group Therapy Note Date: 4/18/2021 Group Start Time: 1300 Group End Time: 7162 Group Topic: Process Group - Inpatient SRM 2 BH NON ACUTE [facility] GROUP DOCUMENTATION GROUP Group Therapy Note Attendees: 6 Pt's were invited to process group where they were encouraged to participate in discussions and process their own thoughts and feelings. Pt's were encouraged to support each other and provide feedback when appropriate. They were encouraged to be respectful of each other, follow the group rules and open up in a group setting. Today's group focus circled around relationships. Pt's discussed their own feelings, thoughts and concerns about their own personal relationships and were open to support feedback and assistance with problem solving from their peers. Attendance: Attended Patient's Goal:  Pt will participate in process group to appropriately process their thoughts and feelings while providing support to their peers. Interventions/techniques: Informed, Promoted peer support and Supported Follows Directions: Followed directions Interactions: Interacted appropriately Mental Status: Calm Behavior/appearance: Attentive and Cooperative Goals Achieved: Able to listen to others Additional Notes:  Pt was quiet in group but attentive. He was prompted to share but declined at this time.   
 
Patricia Lima, [address] [facility], [address] [place] [address]

## 2021-04-19 PROCEDURE — 74011250637 HC RX REV CODE- 250/637: Performed by: PSYCHIATRY & NEUROLOGY

## 2021-04-19 PROCEDURE — 65220000003 HC RM SEMIPRIVATE PSYCH

## 2021-04-19 RX ORDER — HYDROXYZINE 25 MG/1
25 TABLET, FILM COATED ORAL
Status: DISCONTINUED | OUTPATIENT
Start: 2021-04-19 | End: 2021-04-28 | Stop reason: HOSPADM

## 2021-04-19 RX ORDER — TRAZODONE HYDROCHLORIDE 50 MG/1
100 TABLET ORAL
Status: DISCONTINUED | OUTPATIENT
Start: 2021-04-19 | End: 2021-04-28 | Stop reason: HOSPADM

## 2021-04-19 RX ADMIN — TRAZODONE HYDROCHLORIDE 100 MG: 50 TABLET ORAL at 20:54

## 2021-04-19 RX ADMIN — BUPROPION HYDROCHLORIDE 100 MG: 100 TABLET, FILM COATED ORAL at 08:49

## 2021-04-19 NOTE — BH NOTES
Patient was up for breakfast & returned to bed. He got up slowly when called for medication. He c/o not sleeping well last night. He was remind ed& encouraged to be up more during the day. He verbally contracted for safety & was frequently  Assess for  SI. He has denied any anxiety, SI or HI . He rated his depression 4/10/  He reports feeling some better. He verbalized decrease in thoughts of self harm. He has been medication compliant. He has attend some groups. He isolates when up  In the dayroom. He remains on close observation.

## 2021-04-19 NOTE — BH NOTES
Pt presents flat, sad, endorses feeling depressed, denies anxiety, minimally social w/ peers in environs, poor eye contact, soft spoken, disinclined to carry conversation w/ others, polite, contracts for safety w/ staff  No bx issues noted, med compliant  Denies SI HI NSSI denies sleep med appetite problems denies AVH  Continuing to monitor

## 2021-04-19 NOTE — GROUP NOTE
[facility]  GROUP DOCUMENTATION INDIVIDUAL Group Therapy Note Date: 4/19/2021 Group Start Time: 1 Group End Time: 1546 Group Topic: Nursing SRM 2 BEHA [facility] Natalie Willard LPN IP  GROUP DOCUMENTATION GROUP Group Therapy Note Attendees: 6/9 Attendance: Did not attend Patient's Goal:  Increase the ability to cope with daily stressors & symptoms of illness Interventions/techniques: Follows Directions:  
 
Interactions:  
 
Mental Status:  
 
Behavior/appearance:  
 
Goals Achieved: Additional Notes:  Pt. Invited did not attend Donnette Boast, LPN

## 2021-04-19 NOTE — BH NOTES
Behavioral Health Treatment Team Note     Patient goal(s) for today: \"write down things I want to say in command session\"  Treatment team focus/goals: medication management, group therapy, collateral, DC planning    Progress note: Pt presented with depressed affect, but reported feeling \"calm\". Pt denied active SI HI and AVH, but endorsed feeling depressed and anxious. Pt reported he is nervous about command session and possible [facility]. Pt still lacks insight and has poor judgement. Pt appears hopeless and guarded. Writer will continue to work with him on challenging negative thoughts and healthy communication.      LOS:  5  Expected LOS: 8    Insurance info/prescription coverage:    Date of last family contact:  Command today  Family requesting physician contact today:  no  Discharge plan:  Pt will DC to [place] and f/u with [address] [place] [address] in the home:  no   [facility]    Participating treatment team members: Donell Mathew, * (assigned SW), Halina Gibson LMSW

## 2021-04-20 PROCEDURE — 74011250637 HC RX REV CODE- 250/637: Performed by: PSYCHIATRY & NEUROLOGY

## 2021-04-20 PROCEDURE — 65220000003 HC RM SEMIPRIVATE PSYCH

## 2021-04-20 RX ORDER — QUETIAPINE FUMARATE 25 MG/1
25 TABLET, FILM COATED ORAL
Status: DISCONTINUED | OUTPATIENT
Start: 2021-04-21 | End: 2021-04-28 | Stop reason: HOSPADM

## 2021-04-20 RX ADMIN — TRAZODONE HYDROCHLORIDE 100 MG: 50 TABLET ORAL at 22:24

## 2021-04-20 RX ADMIN — BUPROPION HYDROCHLORIDE 100 MG: 100 TABLET, FILM COATED ORAL at 08:51

## 2021-04-20 NOTE — GROUP NOTE
[facility] GROUP DOCUMENTATION INDIVIDUAL Group Therapy Note Date: 4/20/2021 Group Start Time: 1500 Group End Time: 0539 Group Topic: Process Group - Inpatient SRM 2 BH NON ACUTE Terryl Senthil [facility] GROUP DOCUMENTATION GROUP Group Therapy Note This worker encouraged the patient's to participate in group check in exercise to share a goal that they have at [facility] and any progress they have made towards that goals with peers. This worker encouraged peers to share events leading to admission, experience at [facility], and progress towards goals with peers for feedback, support, and problem solving. Patient were encouraged to reflect on group experience. Attendees: 6/9 Attendance: Attended Patient's Goal:  Have more positive thoughts than negative thoughts Interventions/techniques: Promoted peer support and Supported Follows Directions: Followed directions Interactions: Interacted appropriately Mental Status: Blunted Behavior/appearance: Attentive Goals Achieved: Able to engage in interactions, Able to listen to others and Able to self-disclose Additional Notes:  Patient shared with peers that he wants to have more positive thoughts than negative. The patient listened to peers for most of group and when this worker prompted him to share the patient was reluctant, but ultimatly shared that he was previously admitted to [address] [place] [address] and when he returned to [facility]. Melanie Bauer nothing had changed there or with his thoughts to harm himself. This worker prompted the patient to discuss what he hoped would have changed and the patient was not sure. This worker acknowledged that suicidal thoughts are difficult to share and the patient replied that he has talked about them too much.  The patient was encouraged by his peers to continue to share that it will get easier as he talks about his issues and might have some unexpected realizations. Patient did not reflect on group. Nirali Ndiaye

## 2021-04-20 NOTE — BH NOTES
Behavioral Health Treatment Team Note     Patient goal(s) for today: \"make a play list for music group\"  Treatment team focus/goals: medication management, collateral, group therapy, schedule command session    Progress note: Pt presented with depressed affect and congruent mood. Pt also appeared anxious, and stated feeling nervous when discussing command session and signing consent to speak with mom. Pt reported he spoke with his mom last night and she stated she was not disappointed in him, but he has shared SI. Pt requested writer share this with her. Writer spoke with command, see additional note. Command session scheduled for Friday at 1100. Pt denied SI HI and AVH.      LOS:  6  Expected LOS: 10    Insurance info/prescription coverage:  /[facility]  Date of last family contact:  None yet   Family requesting physician contact today:  no  Discharge plan:  Pt and therapist will work together to determine [address] [place] [address] in the home:  no   Outpatient provider(s):  [facility]     Participating treatment team members: Leonardo Torres, * (assigned SW), [place] Ahn LMSW

## 2021-04-20 NOTE — GROUP NOTE
IP  GROUP DOCUMENTATION INDIVIDUAL Group Therapy Note Date: 4/20/2021 Group Start Time: 7592 Group End Time: 1400 Group Topic: Recreational/Music Therapy SRM 2  NON ACUTE [facility] GROUP DOCUMENTATION GROUP Group Therapy Note Facilitated leisure skills group focused on positive coping skills through social interactions, group activities, music and art tasks Attendees: 6/11 Attendance: Attended Patient's Goal: *STG: Attends activities and groups Interventions/techniques: Art integration and Supported Follows Directions: Followed directions Interactions: Interacted appropriately Mental Status: Calm Behavior/appearance: Cooperative Goals Achieved: Able to engage in interactions and Able to listen to others Additional Notes:  Pt was receptive to music and songs he selected. Pt declined to work on leisure packet but was observed interacting with peers. Lynn Green, RT Intern

## 2021-04-20 NOTE — PROGRESS NOTES
Problem: Depressed Mood (Adult/Pediatric)  Goal: *STG: Remains safe in hospital  Outcome: Progressing Towards Goal   Pt remains safe while in the hospital.     Problem: Suicide  Goal: *STG: Remains safe in hospital  Outcome: Progressing Towards Goal   Pt remains safe while in the hospital. Pt denies having any suicidal thoughts. No gestures noted. Problem: Suicide  Goal: *STG/LTG: No longer expresses self destructive or suicidal thoughts  Outcome: Progressing Towards Goal   No self destructive or suicidal thoughts expressed. Problem: Falls - Risk of  Goal: *Absence of Falls  Description: Document Verlena Batters Fall Risk and appropriate interventions in the flowsheet. Outcome: Progressing Towards Goal  Note: Fall Risk Interventions:  Pt remains free from falls /injury. None reported or noted.

## 2021-04-20 NOTE — GROUP NOTE
[facility] GROUP DOCUMENTATION INDIVIDUAL Group Therapy Note Date: 4/20/2021 Group Start Time: 8205 Group End Time: 1200 Group Topic: Education Group - Inpatient [facility]M [facility] OP Marvin Jansen [facility] GROUP DOCUMENTATION GROUP Group Therapy Note The therapist facilitated a group by beginning with a check-in activity, before discussing a work sheet on setting achievable goals. The therapist asked the patients to provide examples of goals they might create. Attendees: 11/12 Attendance: Attended Patient's Goal:  To attend groups Interventions/techniques: Supported Follows Directions: Followed directions Interactions: Interacted appropriately Mental Status: Calm and Flat Behavior/appearance: Withdrawn/quiet Goals Achieved: Able to listen to others Additional Notes: The patient appeared to be listening while his peers spoke, although was quiet throughout group, even when prompted by the therapist to share. Sheyla Davies

## 2021-04-20 NOTE — BH NOTES
Patient seen for follow-up patient case discussed he is more depressed over the weekend had a briefly one-to-one patient seen today initially when I entered first time he went to deep sleep later got up says trouble falling and staying asleep is a problem we will increase his trazodone patient states is depressed she is dreading he want to leave the [facility] is also wondering about the future he has a degree of apprehension what kind of discharge he will get from the [facility] whether it will be a bad -1.   Depressed but not suicidal will return the staff if his appetite losing control is his vital signs today temperature 98.3 pulse 64 blood pressure 103/57 respirations 18 continued inpatient level of care indicated

## 2021-04-20 NOTE — GROUP NOTE
[facility]  GROUP DOCUMENTATION INDIVIDUAL Group Therapy Note Date: 4/20/2021 Group Start Time: 8374 Group End Time: 8342 Group Topic: Nursing SRM 2 BEHA TH Natalie Christian LPN IP  GROUP DOCUMENTATION GROUP Group Therapy Note Attendees: 7/12 Attendance: Did not attend Patient's Goal: Myths & facts about mental illness Interventions/techniques: Follows Directions:  
 
Interactions:  
 
Mental Status:  
 
Behavior/appearance:  
 
Goals Achieved: Additional Notes:  Pt. Invited did not attend Yarelis Dennis LPN

## 2021-04-20 NOTE — BH NOTES
COLLATERAL:      Writer spoke with Elsa Cheema and 1st Sgt Kayden. They both confirmed that pt had presented \"very down\" and isolative. They reported not having too much interaction with pt d/t admissions and short time at [facility]. They reported after pt DC from [facility] he went for a f/u for medications and expressed something they are still not aware of that caused him to be referred to [facility]. They reported pt's father contacted them worried. Prior to this admission, [facility] staff were speaking of recommending an admin separation. Command session set for Friday at 1100.

## 2021-04-20 NOTE — BH NOTES
The pt has been resting quietly in bed without any distress. Respirations are quiet and unlabored. He remains on close observation with every 15 minute rounding. Throughout the evening, the pt rested quietly in bed or ambulated around the unit. The pt came out of his room while the others patients where in the group session. He was up in the milieu talking on the phone. The pt stated that he talked to his mother only once since he has been here. He rated his anxiety a 3/10 and his depression a 7/10. The pt stated, \" I am disappointed in himself for letting myself get this way. I knew I was going to get worse and did not do anything about it. \"  The pt stated that he wants to get out of the [facility] He stated that he is afraid of what will happen. The pt has a flat, sad, dull affect. He is withdrawn and isolates himself. He did sit up in the milieu for a brief period watching T.V. He has accepted snacks and something to drink. VSS. No c/o pain or discomfort.

## 2021-04-21 PROCEDURE — 65220000003 HC RM SEMIPRIVATE PSYCH

## 2021-04-21 PROCEDURE — 74011250637 HC RX REV CODE- 250/637: Performed by: PSYCHIATRY & NEUROLOGY

## 2021-04-21 RX ADMIN — QUETIAPINE 25 MG: 25 TABLET ORAL at 21:29

## 2021-04-21 RX ADMIN — TRAZODONE HYDROCHLORIDE 100 MG: 50 TABLET ORAL at 21:29

## 2021-04-21 RX ADMIN — BUPROPION HYDROCHLORIDE 100 MG: 100 TABLET, FILM COATED ORAL at 08:47

## 2021-04-21 NOTE — PROGRESS NOTES
Patient alert and oriented x4. Patient denies si/hi/avh. Patient states he did have some si earlier today but states he is ok now and that he will aundrea writer or other staff if he feels suicidal or has thoughts to hurt himself. Patient states anxiety is 4/10 related to upcoming command meeting and expressing his opinion to command. He states he doesn't think they will like his opinion. Patient states depression is 6/10. Patient has a flat affect and is guarded. He is in milieu interacting with peers. He is noted pacing the unit at times. Patient declined medication for anxiety and sleep. He is calm and cooperative at this time. Will continue to monitor.

## 2021-04-21 NOTE — BH NOTES
Patient case discussed in the treatment team in the morning there is some generalized anxiety concern about what the command may do in the future as he definitely want to get out of [facility]. Apparently has some apprehension what the family say he did say he talk to his mother and he did say he slept well and woke up refreshed however says he still has anxiety nightmares having made suicidal suicidal attempt in the community after discharge from the [facility]. He says he has some suicidal thoughts he will be joining before the [employer] he can relate to what was at about he felt his mom is supportive of his him and his decisions.   Less anxious less depressed I will go ahead and give a low-dose of Seroquel 25 mg at night his vital signs today temperature 97.9 pulse 87 blood pressure 113/75 respiration 18 encouraged to attend all the groups not to spend time in the room continued inpatient level of care indicated at this time

## 2021-04-21 NOTE — PROGRESS NOTES
Problem: Depressed Mood (Adult/Pediatric)  Goal: *STG: Participates in treatment plan  Outcome: Progressing Towards Goal  Goal: *STG: Attends activities and groups  Outcome: Progressing Towards Goal  Goal: *STG: Remains safe in hospital  Outcome: Progressing Towards Goal  Goal: *STG: Complies with medication therapy  Outcome: Progressing Towards Goal     Problem: Suicide  Goal: *STG: Attends activities and groups  Outcome: Progressing Towards Goal  Goal: *STG/LTG: Complies with medication therapy  Outcome: Progressing Towards Goal  Goal: *STG/LTG: No longer expresses self destructive or suicidal thoughts  Outcome: Progressing Towards Goal

## 2021-04-21 NOTE — BH NOTES
JEANMARIE:      Pt signed consent to speak with his mom, Zhanna Gibson [phone]. Pt signed for release of entire record. He reported speaking to his mom, but he did not tell her about SI. Pt requested writer share this information with pt's mom.

## 2021-04-21 NOTE — BH NOTES
Writer attempted to contact pt's mom, Edgar Abdi ([phone]), no answer, left VM, will f/u as needed.

## 2021-04-21 NOTE — BH NOTES
Patient presents with a flat affect. Quiet & cooperative. Medication compliant. No side effects noted. Attends groups. States that he wants out of the [employer]. No attempts to harm self. Agreed to come to staff if thoughts of self harm occur before acting on these feelings. Stated that he will have a command meeting on Friday & feels nervous about the meeting. Remains on CO. Continue to monitor patient closely & provide a safe, therapeutic environment. Presently walking laps around the unit.

## 2021-04-21 NOTE — GROUP NOTE
[facility] GROUP DOCUMENTATION INDIVIDUAL Group Therapy Note Date: 4/21/2021 Group Start Time: 1300 Group End Time: 1400 Group Topic: Process Group - Inpatient [facility] GROUP DOCUMENTATION GROUP Group Therapy Note Attendees: 5/9 Process: Pts were encouraged to share what brought them to the hospital as a check in question. Pts were then encouraged to share issues they are facing and provide feedback to one another. Pts were then asked to reflect on group Attendance: Attended Patient's Goal:  Pt shared that he has been struggling with suicidal ideation and depression and that is what led to his admission Interventions/techniques: Validated, Promoted peer support, Provide feedback and Supported Follows Directions: Followed directions Interactions: Interacted appropriately Mental Status: Calm, Depressed and Flat Behavior/appearance: Attentive, Neatly groomed and Withdrawn/quiet Goals Achieved: Able to engage in interactions, Able to listen to others, Able to reflect/comment on own behavior and Able to receive feedback Additional Notes:  Pt was quiet for most of group. Pt reported that he was feeling 'down'. Writer encouraged pt to share what he is struggling with if he felt safe doing so and pt said that he has realized that the [place] is 'not for him' and he is wanting to return home but is anxious about doing so. Peers provided appropriate feedback. Pt reflected that he learned he can do 'anything I put my mind to' at the end of group. Pt is making progress towards goals by attending and participating in group [facility]

## 2021-04-21 NOTE — BH NOTES
COLLATERAL      Writer spoke with pt's mom, Beltran Cunningham. Beltran Cunningham reported finding out reason for admission through pt's cousin as they are close. Beltran Cunningham reported she is not aware of any self-harm or talk about SI when pt was in middle school. She reported pt has been bullied, but they were not aware of the extent. She reported pt shared that his prior admission to [facility] was triggered by NCT (training). Beltran Cunningham denied [address] within her family, but was not sure about 's side. Beltran Cunningham reported she and  will support pt if he separates, he can return there an \"no one has to know\" why he has come home. She reported they want what is best for pt. Beltran Cunningham reported pt at baseline is shy and nervous, but will open up when comfortable. Beltran Cunningham reported pt denied bullying at [facility]. Beltran Cunningham became tearful during conversation. Writer updated her on plan for command session and probability of separation. Family session will be scheduled for next week, after completing command session.

## 2021-04-21 NOTE — GROUP NOTE
MICHELL  GROUP DOCUMENTATION INDIVIDUAL Group Therapy Note Date: 4/21/2021 Group Start Time: 1100 Group End Time: 6833 Group Topic: Education Group - Inpatient [facility] Group Jay GALARZA  GROUP DOCUMENTATION GROUP Group Therapy Note Attendees: 2/9 Psych Ed: Pts were asked what their goal for the day was as a check in question. Pts were then walked through the safety plan and encouraged to share their triggers/warning signs, coping skills and reflect on who they can reach out to when needed. Pts then discussed positivity and what it means to them and writer facilitated a breathing exercise before asking pts to reflect on group Attendance: Did not attend Additional Notes:  Pt was encouraged to attend but chose not to do so [facility]

## 2021-04-22 PROCEDURE — 74011250637 HC RX REV CODE- 250/637: Performed by: PSYCHIATRY & NEUROLOGY

## 2021-04-22 PROCEDURE — 65220000003 HC RM SEMIPRIVATE PSYCH

## 2021-04-22 RX ORDER — BUPROPION HYDROCHLORIDE 75 MG/1
150 TABLET ORAL
Status: DISCONTINUED | OUTPATIENT
Start: 2021-04-23 | End: 2021-04-28 | Stop reason: HOSPADM

## 2021-04-22 RX ADMIN — QUETIAPINE 25 MG: 25 TABLET ORAL at 21:33

## 2021-04-22 RX ADMIN — TRAZODONE HYDROCHLORIDE 100 MG: 50 TABLET ORAL at 21:33

## 2021-04-22 RX ADMIN — BUPROPION HYDROCHLORIDE 100 MG: 100 TABLET, FILM COATED ORAL at 08:27

## 2021-04-22 NOTE — GROUP NOTE
[facility] GROUP DOCUMENTATION INDIVIDUAL Group Therapy Note Date: 4/22/2021 Group Start Time: 1500 Group End Time: 2742 Group Topic: Process Group - Inpatient [facility] GROUP DOCUMENTATION GROUP Group Therapy Note Attendees: 6 Patients were encouraged to share and process how they have been feeling throughout the day. Patients were encouraged to offer each other feedback. Attendance: Attended Patient's Goal: Attends activities and groups Interventions/techniques: Validated, Promoted peer support, Provide feedback, Reinforced and Supported Follows Directions: Followed directions Interactions: Interacted appropriately Mental Status: Restricted Behavior/appearance: Attentive and Withdrawn/quiet Goals Achieved: Able to listen to others Additional Notes:  Pt listened attentively to his peers throughout the group but voiced that he did not want to share when prompted. Pt continues to work toward his goal of attending groups. Erin Lemon

## 2021-04-22 NOTE — GROUP NOTE
[facility] GROUP DOCUMENTATION INDIVIDUAL Group Therapy Note Date: 4/21/2021 Group Start Time: 1900 Group End Time: 2000 Group Topic: Recreational/Music Therapy SRM 2  NON ACUTE Deward Docker [facility] GROUP DOCUMENTATION GROUP Group Therapy Note Attendees: 4 Introduce healthy leisure task skill as positive way to cope and manage mood Attendance: Attended Patient's Goal: STG: Attends activities and groups Interventions/techniques: Art integration and Supported Follows Directions: Followed directions Interactions: Interacted appropriately Mental Status: Calm and Flat Behavior/appearance: Attentive and Cooperative Goals Achieved: Able to engage in interactions and Able to listen to others Additional Notes: Attended group and actively participated. Was receptive to group and verbalized enjoyment. Accepted leisure packet. Worked on puzzles and selected songs to listen to in group. Engaged with staff / peers with prompts.  
  
Kalpana Felton, VENKATS

## 2021-04-22 NOTE — BH NOTES
Up in dayroom this shift; interacted with peers; compliant with medication regimenn and treatment plan.

## 2021-04-22 NOTE — BH NOTES
Patient case discussed in the treatment team anticipated command meeting patient remained quite anxious about meeting with the command and also what he fearful to be administrative discharge from the [facility].   Very much preoccupied with his uncle stating it can affect adversely his future including the civilian jobs etc.  Patient did not sleep well when I saw him at 56 to 11:00 he is stable in the bed covering his ice with the dark patch to sleep avoiding the groups and encouraged to attend all the groups and participated in rest he says he had a dream but not a nightmare spending some time with his cousin relaxing still depressed flat affect denied any active suicidal thoughts or plans eating well personal hygiene grooming is better his vital signs today temperature 98.4 pulse 65 blood pressure 107/64 respirations 16 no side effect medication continued inpatient level of care indicated thank you

## 2021-04-22 NOTE — PROGRESS NOTES
Problem: Depression-Palliative Care  Goal: *PALLIATIVE CARE-Alleviation of depression symptoms  Outcome: Progressing Towards Goal     Problem: Depressed Mood (Adult/Pediatric)  Goal: *STG: Participates in treatment plan  Outcome: Progressing Towards Goal  Goal: *STG: Participates in 1:1 therapy sessions  Outcome: Progressing Towards Goal  Goal: *STG: Verbalizes anger, guilt, and other feelings in a constructive manor  Outcome: Progressing Towards Goal  Goal: *STG: Attends activities and groups  Outcome: Progressing Towards Goal  Goal: *STG: Demonstrates reduction in symptoms and increase in insight into coping skills/future focused  Outcome: Progressing Towards Goal  Goal: *STG: Remains safe in hospital  Outcome: Progressing Towards Goal  Goal: *STG: Complies with medication therapy  Outcome: Progressing Towards Goal  Goal: *LTG: Returns to previous level of functioning and participates with after care plan  Outcome: Progressing Towards Goal  Goal: *LTG: Understands illness and can identify signs of relapse  Outcome: Progressing Towards Goal  Goal: Interventions  Outcome: Progressing Towards Goal     Problem: Patient Education: Go to Patient Education Activity  Goal: Patient/Family Education  Outcome: Progressing Towards Goal     Problem: Anxiety  Goal: *Alleviation of anxiety  Outcome: Progressing Towards Goal  Goal: *Alleviation of anxiety (Palliative Care)  Outcome: Progressing Towards Goal     Problem: Patient Education: Go to Patient Education Activity  Goal: Patient/Family Education  Outcome: Progressing Towards Goal     Problem: Suicide  Goal: *STG: Remains safe in hospital  Outcome: Progressing Towards Goal  Goal: *STG: Seeks staff when feelings of self harm or harm towards others arise  Outcome: Progressing Towards Goal  Goal: *STG: Attends activities and groups  Outcome: Progressing Towards Goal  Goal: *STG:  Verbalizes alternative ways of dealing with maladaptive feelings/behaviors  Outcome: Progressing Towards Goal  Goal: *STG/LTG: Complies with medication therapy  Outcome: Progressing Towards Goal  Goal: *STG/LTG: No longer expresses self destructive or suicidal thoughts  Outcome: Progressing Towards Goal  Goal: *LTG:  Identifies available community resources  Outcome: Progressing Towards Goal  Goal: *LTG:  Develops proactive suicide prevention plan  Outcome: Progressing Towards Goal  Goal: Interventions  Outcome: Progressing Towards Goal     Problem: Patient Education: Go to Patient Education Activity  Goal: Patient/Family Education  Outcome: Progressing Towards Goal     Problem: Falls - Risk of  Goal: *Absence of Falls  Description: Document Jace Fall Risk and appropriate interventions in the flowsheet.   Outcome: Progressing Towards Goal  Note: Fall Risk Interventions:                                Problem: Patient Education: Go to Patient Education Activity  Goal: Patient/Family Education  Outcome: Progressing Towards Goal

## 2021-04-22 NOTE — BH NOTES
Behavioral Health Treatment Team Note     Patient goal(s) for today: \" I don't know anymore\"  Treatment team focus/goals: medication management, group therapy, plan for command session, collateral    Progress note: Pt presented with depressed affect and reported feeling \"nervous:. Pt denied active SI HI and AVH, but reported having intrusive thoughts and dreams about SI, feelings of hopelessness, self-loathing and depression. Pt preoccupied with the type of DC he may have from [facility]. Pt and writer processed this again. He reported his uncle, who retired from [employer], had been recommending a medical DC. Pt claimed he is not familiar what this type of DC entails. Pt fixated on speaking about prior suicide gestures, many of them being interrupted by something or \"not working\", causing him to [place] up\". Pt reported when he made a noose and went to the [facility], his friend text him to  Come to the [facility]. Pt reported he chose to go, and told his friend of the plan had he not intervened. Pt had difficulty identifying healthy ways to cope with these feeling or see positive in future. Pt remained negative. Pt reported that if the DC from [facility] impacts him negatively in the future, he will go through with his plan to jump off a bridge or use his dad's firearm to shoot himself. Pt declined family session as he reported he does not talk to his parents about this stuff after he told them of an attempt in middle school and his father laughed. Pt was encouraged to think about this. Command session set for tomorrow at 11. LOS:  8  Expected LOS: 12    Insurance info/prescription coverage:    Date of last family contact:  Todays 4/21/21-pt's mother Toya [place]. Family requesting physician contact today:  no  Discharge plan:  Pt will discharge to [facility] with follow up behavioral health clinic for continued mental health  Treatment outpatient, once discharged.    Guns in the home:  no Outpatient provider(s):  [employer]    Participating treatment team members: Cruzito Leach, Armando (assigned SW), Toy Conception, LMSW

## 2021-04-22 NOTE — BH NOTES
Patient pleasant upon approach, affect flat, fair eye contact, rated anxiety at a 6 to 7 related to worry about the command meeting. Patient rated depression at a 4 on a scale of 0-10. Patient said  that he did not have nightmares at the present time but \"weird dreams. \"  He denied SI, HI, and AH and VH. Patient was medication compliant and isolative this morning returning back to bed after breakfast.  Patient remains on close observation, Q 15 minute checks.

## 2021-04-22 NOTE — BH NOTES
Patient case discussed with the treatment team patient seen for case management of history of depression hospitalization for patient community prior to joining the [employer]. He says slept well do not remember much dreams and I saw him he was wearing air eye patch and sleeping resting woke up he says he attending the groups. And he is still anxious apprehensive about what he felt to be an administrative discharge not a medical discharge. As per his alcohol he felt that the [place] for discharge and not in good 1. Not really desirable encouraged to attend all the groups I went ahead increasing his Wellbutrin to 150 mg a day to give energy motivation he says he was in school is great for more direct care being since he is he was not much active in school.   His only child's vital signs today temperature 97.7 pulse 65 blood pressure 98/68 respiration 18 no side effect medication continue present medication

## 2021-04-22 NOTE — GROUP NOTE
[facility] GROUP DOCUMENTATION INDIVIDUAL Group Therapy Note Date: 4/22/2021 Group Start Time: 2091 Group End Time: 1200 Group Topic: Education Group - Inpatient [id number] [facility] Robert Haji [facility] GROUP DOCUMENTATION GROUP Group Therapy Note Attendees: 6/10 Psych Ed: Pts were asked what they knew about boundaries as a check in question. Pts were then walked through boundaries and encouraged to describe internal boundaries, healthy and unhealthy boundaries. Pts were then asked to reflect on group. Attendance: Attended Patient's Goal:  Pt came to group late and did not share an answer to check in question Interventions/techniques: Informed, Validated, Promoted peer support, Provide feedback and Supported Follows Directions: Followed directions Interactions: Interacted appropriately Mental Status: Calm, Congruent, Depressed and Flat Behavior/appearance: Attentive, Neatly groomed and Withdrawn/quiet Goals Achieved: Able to engage in interactions and Able to listen to others Additional Notes:  Pt was quiet throughout group and did not speak. Pt was paying attention to peers and listening. Pt is making some progress towards goals by attending group [facility]

## 2021-04-22 NOTE — BH NOTES
Behavioral Health Treatment Team Note     Patient goal(s) for today: \"To call my parents and tell them I am fine\"  Treatment team focus/goals: Command session scheduled for tomorrow to assist with determining next steps for  continuation or discharge. Progress note: Pt was lying in his bed, pt expressed he is more tired in the mornings and tries to \"nap\" until the afternoon. Pt reported a 4/10 depression and 6/10 anxiety due to upcoming command session. Pt continues with a flat and low, flat tone when talking. Pt often does not interact with peers and is continued to be encouraged to attend groups. Pt denies SI/HI and A/V hallucinations. Pt. Continues to benefit from inpatient level of care due to his presentation of flat affect and depressed state. Pt. Nora [place] to have a lack of insight into what the command meeting will bring, regarding discharge or continuation within the [employer]. LOS:  8  Expected LOS: 12 days    Insurance info/prescription coverage:    Date of last family contact:  Yesterday, 4/21/21-pt's mother Shlebi Snyder. Family requesting physician contact today:  no  Discharge plan:  Pt will discharge to [facility] with follow up behavioral health clinic for continued mental health  Treatment outpatient, once discharged. Guns in the home:  no   Outpatient provider(s):  [facility] [employer]    Participating treatment team members: Mitzy Leon- assigned LAST.

## 2021-04-23 PROCEDURE — 65220000003 HC RM SEMIPRIVATE PSYCH

## 2021-04-23 PROCEDURE — 74011250637 HC RX REV CODE- 250/637: Performed by: PSYCHIATRY & NEUROLOGY

## 2021-04-23 RX ADMIN — BUPROPION HYDROCHLORIDE 150 MG: 75 TABLET, FILM COATED ORAL at 06:43

## 2021-04-23 RX ADMIN — TRAZODONE HYDROCHLORIDE 100 MG: 50 TABLET ORAL at 21:05

## 2021-04-23 RX ADMIN — QUETIAPINE 25 MG: 25 TABLET ORAL at 21:03

## 2021-04-23 NOTE — BH NOTES
Behavioral Health Treatment Team Note     Patient goal(s) for today: 'call my dad'  Treatment team focus/goals: continue medication management, group therapy and prepare for command session    Progress note: Pt presented with a flat affect and depressed mood. Pt said that he is 'feeling down' because he won't be able to see his family for about 6-9 months due to the process of leaving the [employer]. Pt spoke about how is upset about this due to not seeing his family for a long time anyway. Pt endorsed depression and anxiety but denied SI/HI and AVH at this time.  Pt still needs an inpatient level of care due to experiencing severe symptoms and not having a safe discharge plan    LOS:  9  Expected LOS: 12 days    Insurance info/prescription coverage:    Date of last family contact: 2.19.18  Family requesting physician contact today:  no  Discharge plan:  Pt will discharge to [facility] and follow up with outpatient treatment   Guns in the home:  no   Outpatient provider(s):  [facility] [employer]    Participating treatment team members: Jeremy Dye, * (assigned SW)TIKI

## 2021-04-23 NOTE — GROUP NOTE
[facility] GROUP DOCUMENTATION INDIVIDUAL Group Therapy Note Date: 4/23/2021 Group Start Time: 8091 Group End Time: 1013 Group Topic: Education Group - Inpatient [facility] Group Héctor Kincaid [facility] GROUP DOCUMENTATION GROUP Group Therapy Note Attendees: 7/13 Psych-Ed: Pts were asked to use a colour to describe how they were feeling. Pts were then walked through a meditation exercise and encouraged to share their thoughts on mindfulness throughout. Pts were then asked to reflect on group Attendance: Attended Patient's Goal:  Pt reported feeling 'blue' Interventions/techniques: Informed, Validated, Promoted peer support and Supported Follows Directions: Followed directions Interactions: Interacted appropriately Mental Status: Calm, Depressed and Flat Behavior/appearance: Attentive, Neatly groomed and Withdrawn/quiet Goals Achieved: Able to engage in interactions and Able to listen to others Additional Notes:  Pt said they liked the meditation and usually use rain as a way to meditate. Pt is making progress towards goals by attending and participating in group [facility]

## 2021-04-23 NOTE — GROUP NOTE
[facility] GROUP DOCUMENTATION INDIVIDUAL Group Therapy Note Date: 4/23/2021 Group Start Time: 1500 Group End Time: 3431 Group Topic: Process Group - Inpatient [facility] GROUP DOCUMENTATION GROUP Group Therapy Note Attendees: 8 Patients were encouraged to share about the feelings they have experienced throughout the day. Patients were encouraged to offer each other feedback about their feelings and experiences. Attendance: Did not attend Patient was encouraged to join but they did not attend. Trino Trinh

## 2021-04-23 NOTE — BH NOTES
Patient pleasant upon approach, affect flat, patient compliant with request to playing games with other patients, patient noted playing chess with peer, affect remained flat. Patient rated depression at a 4 and anxiety at a 3 on a scale of 0-10. Patient denied SI, HI, AH, and VH. Patient remains on close  observation, Q 15 minute checks.

## 2021-04-23 NOTE — PROGRESS NOTES
Problem: Depressed Mood (Adult/Pediatric)  Goal: *STG: Verbalizes anger, guilt, and other feelings in a constructive manor  Outcome: Progressing Towards Goal     Problem: Depressed Mood (Adult/Pediatric)  Goal: *STG: Remains safe in hospital  Outcome: Progressing Towards Goal     Problem: Depressed Mood (Adult/Pediatric)  Goal: *STG: Complies with medication therapy  Outcome: Progressing Towards Goal     Patient has been able to express his feelings  Patient has remained safe so far this shift. Patient was medication compliant. Patient remains on close observation. Patient has been alert, oriented, cooperative and pleasant. Patient has been up on the unit. He has a flat affect and a soft voice. He rated his depression as 4/10 and his anxiety as 4/10. He said he is \"alright today. \"  He said he has no thoughts to harm himself today. He later said he has to stay in [facility] for another year per the [employer]. Medication compliant. Continue to asssess. Since going to bed, patient has been laying down quietly with his eyes closed.

## 2021-04-23 NOTE — GROUP NOTE
IP  GROUP DOCUMENTATION INDIVIDUAL Group Therapy Note Date: 4/23/2021 Group Start Time: 0783 Group End Time: 1400 Group Topic: Recreational/Music Therapy SRM 2  NON ACUTE Lou Mcduffie [facility] GROUP DOCUMENTATION GROUP Group Therapy Note Facilitated leisure skills group focused on positive coping skills through social interactions, group activities, music and art tasks Attendees: 6/12 Attendance: Attended Patient's Goal:  Attend groups daily Interventions/techniques: Art integration and Supported Follows Directions: Followed directions Interactions: Interacted appropriately Mental Status: Calm and Flat Behavior/appearance: Cooperative Goals Achieved: Able to engage in interactions and Able to listen to others Additional Notes:  Pt was receptive to music and songs he selected in group. Pt declined to work on leisure packet but was observed interacting with peers. Cristopher Fry, RT Intern

## 2021-04-23 NOTE — BH NOTES
Patient case discussed in the treatment team staff is trying to get a command meeting today I believe is not possible till Monday more engaging insight poor judgment continued inpatient level of care indicated eating well sleeping well affect is rather very flat 97 temperature 97.7 pulse 49 respiration 18 respiration 18 blood pressure 89/50 encouraged to drink fluids and make sure he eats get up slowly

## 2021-04-24 PROCEDURE — 65220000003 HC RM SEMIPRIVATE PSYCH

## 2021-04-24 PROCEDURE — 74011250637 HC RX REV CODE- 250/637: Performed by: PSYCHIATRY & NEUROLOGY

## 2021-04-24 RX ADMIN — QUETIAPINE 25 MG: 25 TABLET ORAL at 21:09

## 2021-04-24 RX ADMIN — TRAZODONE HYDROCHLORIDE 100 MG: 50 TABLET ORAL at 21:09

## 2021-04-24 RX ADMIN — BUPROPION HYDROCHLORIDE 150 MG: 75 TABLET, FILM COATED ORAL at 09:12

## 2021-04-24 NOTE — BH NOTES
Dx: Depression w/SI    Affect flat. Cont to have a sad facial expression. Compliant with scheduled meds. Consumed 3/4 of meals. Observed interacting some with peers. Encouraged to attend groups. Encouraged to drink plenty of flids; BP 95/77 P88. Q 15 mins checks maintained,for safety. Attended one group. No attempts to self harm. Observed interacting with peers.

## 2021-04-24 NOTE — GROUP NOTE
IP  GROUP DOCUMENTATION INDIVIDUAL Group Therapy Note Date: 4/24/2021 Group Start Time: 0701 Group End Time: 1400 Group Topic: Process Group - Inpatient SRM 2  NON ACUTE [facility] GROUP DOCUMENTATION GROUP Group Therapy Note This write held a process group to allow the individual to process their feelings surrounding their current hospitalization and to discuss the following topics: stages of change model, healthy supports, substance abuse recovery, self-care, and how to properly maintain mental health after discharge. This writer processed with group the five stages of change model. Attendees: 8 out of 12 Attendance: Attended Patient's Goal:  Attends activities and groups. Interventions/techniques: Challenged and Informed Follows Directions: Followed directions Interactions: Interacted appropriately Mental Status: Calm and Flat Behavior/appearance: Attentive and Withdrawn/quiet Goals Achieved: Able to listen to others and Identified feelings Additional Notes:  Patient attended group with minimal participation. He was appeared withdrawn and depressed with flat affect. He reported he was feeling \"down\" and angry. He did not verbally contribute  to the group discussion. He did report he wanted to work on staying calm in his current situation. Lorri Collet

## 2021-04-24 NOTE — BH NOTES
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 He says that his depression was a 5/10, anxiety 4/10, but denies SI, Hi, and hallucinations. Pt was med compliant. He was cooperative, calm, but very flat, sad and soft spoken when talking with nurse. He has been resting well without any distress noted, respirations regular and unlabored. Will continue to monitor patient every 15 min as per unit protocol.

## 2021-04-24 NOTE — PROGRESS NOTES
Problem: Depressed Mood (Adult/Pediatric)  Goal: *STG: Remains safe in hospital  Outcome: Progressing Towards Goal  Goal: *STG: Complies with medication therapy  Outcome: Progressing Towards Goal     Problem: Anxiety  Goal: *Alleviation of anxiety  Outcome: Progressing Towards Goal     Problem: Suicide  Goal: *STG: Remains safe in hospital  Outcome: Progressing Towards Goal  Goal: *STG/LTG: Complies with medication therapy  Outcome: Progressing Towards Goal

## 2021-04-24 NOTE — BH NOTES
Behavioral Health Treatment Team Note     Patient goal(s) for today: 'to find a book to read'   Treatment team focus/goals: group therapy, medication management     Progress note: Pt presented with a flat affect and depressed mood. Pt stated that he was feeling down today because he was thinking about how he may need to stay in [place] for a year or more. Pt stated that he was not feeling any SI/HI/AVH at this time. Pt stated that he was not feeling depressed and stated a little bit of anxiety. Pt in need of inpatient level of care due to continued flat presentation.      LOS:  10  Expected LOS: 12    Insurance info/prescription coverage:  /BSI [facility]      Date of last family contact:  4/21/21  Family requesting physician contact today:  no  Discharge plan:  Pt will discharge to [facility] and follow up with outpatient treatment   Guns in the home:  no   Outpatient provider(s):  [facility]    Participating treatment team members: Benjamin Zaman, * (assigned SW), Albaro Omer

## 2021-04-25 PROCEDURE — 74011250637 HC RX REV CODE- 250/637: Performed by: PSYCHIATRY & NEUROLOGY

## 2021-04-25 PROCEDURE — 65220000003 HC RM SEMIPRIVATE PSYCH

## 2021-04-25 RX ADMIN — QUETIAPINE 25 MG: 25 TABLET ORAL at 21:43

## 2021-04-25 RX ADMIN — TRAZODONE HYDROCHLORIDE 100 MG: 50 TABLET ORAL at 21:43

## 2021-04-25 RX ADMIN — BUPROPION HYDROCHLORIDE 150 MG: 75 TABLET, FILM COATED ORAL at 09:54

## 2021-04-25 RX ADMIN — ACETAMINOPHEN 650 MG: 325 TABLET ORAL at 23:03

## 2021-04-25 NOTE — BH NOTES
Patient has been up in the day room, watching television and socializing with peers. He alert & oriented X 4. His affect is flat. Patient denies SI, HI, AH & VH. His depression/anxiety is a 4/10. He wants out of [employer] and return home as soon as he can. He compliant with medication. He on close observation for safety. No acute distress noted.

## 2021-04-25 NOTE — BH NOTES
Dx: Depression w/Suicide Attempt    No change in behavior; affect remains flat. Sad facial expression. Cont to say he wants to get out of the [place]. Compliant with scheduled meds. Denied side effects. Appetite good; consumed 3/4 of meals. Encouraged to cont to attend groups. Q 15 mins checks maintained, for safety. Attended one group.

## 2021-04-25 NOTE — BH NOTES
Psycho-ED group - defense mechanisms      Pt did not attend group. Pt showed up for the last few minutes as group was coming to an end.

## 2021-04-25 NOTE — GROUP NOTE
[facility] GROUP DOCUMENTATION INDIVIDUAL Group Therapy Note Date: 4/25/2021 Group Start Time: 12 Group End Time: 3010 Group Topic: Process Group - Inpatient SRM 2  NON ACUTE Bassem Montes [facility] GROUP DOCUMENTATION GROUP Group Therapy Note This writer held a process group in which the patient had to verbalize how they were feeling. The patient was tasked with verbalizing what they learned about themselves. The patient completed an activity in which they had to write a positive or self-reflection letter. Attendees: 8 out of 13 Attendance: Attended Patient's Goal:  Attends activities and groups. Interventions/techniques: Challenged, Informed, Provide feedback and Supported Follows Directions: Followed directions Interactions: Interacted appropriately Mental Status: Anxious, Calm, Depressed and Flat Behavior/appearance: Attentive, Cooperative and Withdrawn/quiet Goals Achieved: Able to listen to others, Able to receive feedback, Discussed coping and Identified feelings Additional Notes:  Patient attended group. He completed the group activity and shared with group. He reported feeling anxious and depressed. He reported he has a meeting tomorrow with his commander at [address] [place] [address] and that he was extremely worried about the meeting. He also reported he learned how to calm down more effectively. He exhibited good active listening skills. He was quiet for most of the group. Marielena Martin

## 2021-04-26 PROCEDURE — 74011250637 HC RX REV CODE- 250/637: Performed by: PSYCHIATRY & NEUROLOGY

## 2021-04-26 PROCEDURE — 65220000003 HC RM SEMIPRIVATE PSYCH

## 2021-04-26 RX ADMIN — QUETIAPINE 25 MG: 25 TABLET ORAL at 21:21

## 2021-04-26 RX ADMIN — TRAZODONE HYDROCHLORIDE 100 MG: 50 TABLET ORAL at 21:21

## 2021-04-26 RX ADMIN — BUPROPION HYDROCHLORIDE 150 MG: 75 TABLET, FILM COATED ORAL at 08:43

## 2021-04-26 NOTE — GROUP NOTE
IP  GROUP DOCUMENTATION INDIVIDUAL Group Therapy Note Date: 4/26/2021 Group Start Time: 5284 Group End Time: 1200 Group Topic: Education Group - Inpatient SRM 2  NON ACUTE [facility] GROUP DOCUMENTATION GROUP Group Therapy Note Facilitated group discussion focused on patients completing a thought record as a way for them to challenge their automatic negative thoughts Attendees: 10/12 Attendance: Attended Patient's Goal:  *STG: Verbalizes anger, guilt, and other feelings in a constructive manor Interventions/techniques: Challenged, Informed and Supported Follows Directions: Followed directions Interactions: Interacted appropriately Mental Status: Calm and Flat Behavior/appearance: Attentive, Cooperative and Withdrawn/quiet Goals Achieved: Able to engage in interactions, Able to listen to others, Able to reflect/comment on own behavior, Able to self-disclose and Identified feelings Additional Notes:  Pt was receptive to intervention discussion. Pt was able to complete his thought record and share with the group but was unable to fully challenge his automatic negative thought. Carmella Cummings, RT Intern

## 2021-04-26 NOTE — BH NOTES
The pt is resting quietly in bed without any distress. He sleeps with the covers pulled over his head. Respirations are quiet and unlabored. He remains on close observation for safety. Throughout the evening, the pt sat up in the milieu watching T.V. The pt sat toward the back of the dayroom, by himself. His affect is flat / sad and he is very soft spoken. He rated his anxiety a 6/10 and his depression a 4/10. The pt has been calm, cooperative, and pleasant, with minimal interaction. He related his anxiety to having a command meeting tomorrow. He denies having any SI, HI, or A/VH. The pt accepted snacks and water to drink. He is med compliant and received prn Trazodone. He c/o a headache and received prn Tylenol at 2303. Pt rated his NAVARRO a 5/10 and described the discomfort as pulsating all over. His BP runs low and was retaken. Presently, his BP is 104/62 and HR is 55. The pt showered prior to going to bed. He remains A+O and ambulates independently.

## 2021-04-26 NOTE — BH NOTES
Behavioral Health Treatment Team Note     Patient goal(s) for today: \"Make decision about family session with parents\"  Treatment team focus/goals: medication management, group therapy, command session, DC planning    Progress note: Pt presented with depressed affect and reported feeling \"nervous\". Pt shared that he feels nervous a lot \"for no reason\". Pt had command session and was able to express desire to separate. Pt denied active SI HI and AVH. Writer encouraged pt to have family session with parents prior to DC, but he is hesitant. Pt could not provide a reason. Writer challenged this as he reported he has been speaking with his father and mother. Writer and pt spoke about PHP, and he is agreeable for writer to make referral to [facility]. Plan for pt to DC tomorrow or Wednesday.     LOS:  12  Expected LOS: 13    Insurance info/prescription coverage:  /BSI [facility]  Date of last family contact:  4/21/21  Family requesting physician contact today:  no  Discharge plan:  Pt will discharge to [facility] and follow up with outpatient treatment   Guns in the home:  no   Outpatient provider(s):  [facility]    Participating treatment team members: Venessa Thorpe, * (assigned SW), Marcelo Bella LMSW

## 2021-04-26 NOTE — BH NOTES
Pt.is up and visible on unit, affect is flat, appetite good, appearance is appropriate,denies feeling suicidal or depressed,pt. is feeling somewhat anxious today, states he has a Command meeting ,continues to express feelings of wanting to separate from service, no other concerns voiced. remains on close observation.

## 2021-04-26 NOTE — PROGRESS NOTES
Problem: Depressed Mood (Adult/Pediatric)  Goal: *STG: Complies with medication therapy  Outcome: Progressing Towards Goal   The pt is med compliant. Problem: Suicide  Goal: *STG: Remains safe in hospital  Outcome: Progressing Towards Goal   Pt remains safe without any suicidal thoughts. No gestures noted. Problem: Suicide  Goal: *STG: Attends activities and groups  Outcome: Progressing Towards Goal   Pt in the milieu with peers watching T.V.    Problem: Suicide  Goal: *STG/LTG: No longer expresses self destructive or suicidal thoughts  Outcome: Progressing Towards Goal   No suicidal thoughts expressed and no gestures noted. No self destructive thoughts expressed. Problem: Falls - Risk of  Goal: *Absence of Falls  Description: Document [facility] and appropriate interventions in the flowsheet. Outcome: Progressing Towards Goal  Note: Fall Risk Interventions:  Pt ambulates independently and without difficulty. No fall reported or noted. Pt remains free from falls or injury.

## 2021-04-26 NOTE — PROGRESS NOTES
Mr. Kae Pierce actively participated in Spirituality Group about forgiveness and hope on 2 non-acute Behavioral Health unit. Chaplains will follow up if further referrals are requested. Chaplain Yamileth Duvall M.Div.    can be reached by calling the  at [facility]  [phone]

## 2021-04-26 NOTE — BH NOTES
Patient seen for follow-up patient is in the bed around 11 in the morning group was going encouraged to attend and he says he usually attends afternoon groups. Later in the day he was noted in the day room with the eating his meals with the peers minimal interaction see he did attend evening groups. 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 Seek and look towards a new opportunities not see himself a loser but is a look at other options he did complete his basic is compliant with the medication no talk about dreams nightmares.   Not suicidal compliant with medication no side effects affect remains flat is occasionally little bit however his vital signs today temperature 98.1 pulse 49 blood pressure 102/53 respiration 18 continued inpatient level of care indicated command meeting tomorrow

## 2021-04-27 PROCEDURE — 65220000003 HC RM SEMIPRIVATE PSYCH

## 2021-04-27 PROCEDURE — 74011250637 HC RX REV CODE- 250/637: Performed by: PSYCHIATRY & NEUROLOGY

## 2021-04-27 RX ADMIN — TRAZODONE HYDROCHLORIDE 100 MG: 50 TABLET ORAL at 21:08

## 2021-04-27 RX ADMIN — BUPROPION HYDROCHLORIDE 150 MG: 75 TABLET, FILM COATED ORAL at 08:49

## 2021-04-27 RX ADMIN — QUETIAPINE 25 MG: 25 TABLET ORAL at 21:08

## 2021-04-27 NOTE — GROUP NOTE
IP  GROUP DOCUMENTATION INDIVIDUAL Group Therapy Note Date: 4/26/2021 Group Start Time: 5 Group End Time: 2020 Group Topic: Recreational/Music Therapy SRM 2  NON ACUTE [facility]rick [facility] GROUP DOCUMENTATION GROUP Group Therapy Note Attendees: 10 Introduce healthy leisure skill as positive way to cope and manage stress. Attendance: Attended Patient's Goal:  STG: Attends activities and groups Interventions/techniques: Art integration and Supported Follows Directions: Followed directions Interactions: Interacted appropriately Mental Status: Calm and Flat Behavior/appearance: Attentive and Cooperative Goals Achieved: Able to engage in interactions and Able to listen to others Additional Notes: Attended group and actively participated. Accepted leisure packet. Worked on puzzles during group. Responded to staff with prompts. Was receptive to intervention. Verbalized enjoyment of leisure group. Used leisure time constructively.   
 
Diana Snyder, VENKATS

## 2021-04-27 NOTE — GROUP NOTE
IP  GROUP DOCUMENTATION INDIVIDUAL                                                                          Group Therapy Note    Date: 4/27/2021    Group Start Time: 1110  Group End Time: 1200  Group Topic: Education Group - Inpatient    SRM 2 BH NON ACUTE    Rayray Moreira    [facility] GROUP DOCUMENTATION GROUP    Group Therapy Note    Facilitated group discussion focused on characteristics that help define healthy relationships      Attendees: 12/18         Attendance: Attended    Patient's Goal:  Attend groups daily     Interventions/techniques: Informed and Supported    Follows Directions: Followed directions    Interactions: Interacted appropriately    Mental Status: Calm and Flat    Behavior/appearance: Attentive, Cooperative and Withdrawn/quiet    Goals Achieved: Able to engage in interactions, Able to listen to others and Able to reflect/comment on own behavior      Additional Notes:  Pt was receptive to intervention discussion. Pt was able to identify that facing conflict and facing it directly, openness to constructive feedback, and willingness to take risks and be vulnerable to all competents of his relationships.      Reynaldo Salas, RT Intern

## 2021-04-27 NOTE — BH NOTES
Patient case discussed in the treatment team with weekend behavior patient is getting out of the bed and attending the groups spending some time with in the day room even though he does not appear to be much interacting with the people depressed anxious but not suicidal focus what the intent to do after his discharge from the [facility].   How you go about it has a command meeting this afternoon reassess the case tomorrow

## 2021-04-27 NOTE — GROUP NOTE
IP  GROUP DOCUMENTATION INDIVIDUAL                                                                          Group Therapy Note    Date: 4/27/2021    Group Start Time: 0171  Group End Time: 1600  Group Topic: Recreational/Music Therapy    SRM 2  NON ACUTE    Prosper Cary    [facility] GROUP DOCUMENTATION GROUP    Group Therapy Note    Facilitated leisure skills group focused on positive coping skills through social interactions, group activities, music and art tasks    Attendees: 6/16         Attendance: Attended    Patient's Goal:  Attend groups daily     Interventions/techniques: Art integration and Supported    Follows Directions: Followed directions    Interactions: Interacted appropriately    Mental Status: Calm and Flat    Behavior/appearance: Cooperative and Withdrawn/quiet    Goals Achieved: Able to engage in interactions and Able to listen to others      Additional Notes:  Pt was receptive to music and song he selected in group. Pt declined to work on leisure packet during group time.      RT Jenniffer Intern

## 2021-04-27 NOTE — BH NOTES
DAYSHIFT NOTE:     Patient is up for his breakfast and sits in the dayroom to eat and goes back to his room to lay down. Patient continues to have a flat/sad affect. Patient stated that he was doing better than how he was when he got admitted but he still has depression, rating 4/10 this morning. Denies having any anxiety. Denies SI/HI. Denies AH/VH. Patient is medication compliant. Patient states that his home is in [place] but he will be at [facility] for at least another year. Patient stated that when he got back home he would like to get a job and do something in the medical field. Patient noted to talk on the phone at times. Attends groups. Close observations continued to ensure patient safety. No physical complaints noted. Will continue to monitor.

## 2021-04-27 NOTE — PROGRESS NOTES
Problem: Depressed Mood (Adult/Pediatric)  Goal: *STG: Attends activities and groups  Outcome: Progressing Towards Goal   Pt attended the evening group session. Problem: Depressed Mood (Adult/Pediatric)  Goal: *STG: Remains safe in hospital  Outcome: Progressing Towards Goal   Pt remains safe while in the hospital. No suicidal gestures noted or reported. Problem: Depressed Mood (Adult/Pediatric)  Goal: *STG: Complies with medication therapy  Outcome: Progressing Towards Goal   Pt is med compliant. Problem: Suicide  Goal: *STG/LTG: No longer expresses self destructive or suicidal thoughts  Outcome: Progressing Towards Goal   No suicidal thoughts noted voiced and no gestures noted. Problem: Falls - Risk of  Goal: *Absence of Falls  Description: Document [facility] Fall Risk and appropriate interventions in the flowsheet. Outcome: Progressing Towards Goal  Note: Fall Risk Interventions:  No falls reported and none noted.

## 2021-04-27 NOTE — BH NOTES
The pt is resting quietly in bed without any distress. His respirations are quiet and unlabored. He remains on close observation for safety. Throughout the evening, the pt attended the evening group session. He sat in the milieu interacting with one of his peers. He accepted something to drink, but refused a snack. He is med compliant and received prn Trazodone. His affect remains flat/ dull. He does not maintain eye contact and his is soft spoken. He rated his anxiety a 7/10 and his depression a 6/10. He denies having any SI, HI, or A/VH. The patients' hand was shaking as he took his medication. The pt stated, \" I am nervous! \" The pt is cooperative and pleasant. He remains A+O and ambulates independently. VSS.

## 2021-04-27 NOTE — GROUP NOTE
IP  GROUP DOCUMENTATION INDIVIDUAL                                                                          Group Therapy Note    Date: 4/27/2021    Group Start Time: 1300  Group End Time: 1400  Group Topic: Process Group - Inpatient    SRM 2  NON ACUTE    Thelma Shah    IP [address] [facility] GROUP DOCUMENTATION GROUP    Group Therapy Note  Patients were encouraged to participate in group check in exercise to discuss most difficult part of last 7 days and something they have learned or a way they have grown in the last 7 days. Patient were encouraged to participate in open discussion by sharing events leading up to admission, experience at [facility], and any information they feel is important to share with peers. Patient were encouraged to provide peers with feedback, support, and problems solving. Patient were encouraged to reflect on group experience. Attendees: 12/16         Attendance: Attended    Patient's Goal:  Address demons    Interventions/techniques: Informed, Promoted peer support and Supported    Follows Directions: Followed directions    Interactions: Interacted appropriately    Mental Status: Flat    Behavior/appearance: Attentive    Goals Achieved: Able to listen to others, Able to receive feedback, Able to self-disclose and Identified triggers      Additional Notes:  Patient shared that dealing with his demons hs been the most difficult part of the last 7 days and that he has learned that he does not have to get rid of himself in order to get rid of the demons. The patient shared that his demon tells him that he is worthless and not good enough and that he battles the voice in his head telling him those things daily and that this admission was the second time he lost the obando with the voice;him, in his head. The patient accepted peer feedback to stay positive and that he is young and has a lot potential to succeed. The patient accepted feedback from peers. Patient did not reflect on group.      Jarvis Buenrostro Katya Murguia

## 2021-04-28 VITALS
TEMPERATURE: 97.7 F | RESPIRATION RATE: 18 BRPM | HEIGHT: 69 IN | SYSTOLIC BLOOD PRESSURE: 104 MMHG | DIASTOLIC BLOOD PRESSURE: 60 MMHG | BODY MASS INDEX: 26.66 KG/M2 | WEIGHT: 180 LBS | OXYGEN SATURATION: 99 % | HEART RATE: 49 BPM

## 2021-04-28 PROCEDURE — 74011250637 HC RX REV CODE- 250/637: Performed by: PSYCHIATRY & NEUROLOGY

## 2021-04-28 RX ORDER — QUETIAPINE FUMARATE 25 MG/1
25 TABLET, FILM COATED ORAL
Qty: 7 TAB | Refills: 0 | Status: SHIPPED | OUTPATIENT
Start: 2021-04-28

## 2021-04-28 RX ORDER — BUPROPION HYDROCHLORIDE 75 MG/1
150 TABLET ORAL
Qty: 7 TAB | Refills: 0 | Status: SHIPPED | OUTPATIENT
Start: 2021-04-29

## 2021-04-28 RX ORDER — TRAZODONE HYDROCHLORIDE 100 MG/1
100 TABLET ORAL
Qty: 7 TAB | Refills: 0 | Status: SHIPPED | OUTPATIENT
Start: 2021-04-28

## 2021-04-28 RX ADMIN — BUPROPION HYDROCHLORIDE 150 MG: 75 TABLET, FILM COATED ORAL at 06:52

## 2021-04-28 NOTE — BH NOTES
Patient discharged in stable mental and physical health. Patient verbalized understanding of discharge instructions. Belongings from safe and storage returned to patient. Patient was calm and cooperative. Denies any thoughts of SI/HI and/or AVH at time of discharge. No physical complaints at time of discharge. Escorted off unit by staff at 21 [phone], leaving in a [employer] vehicle to return to [facility]. Cristi Curiel.

## 2021-04-28 NOTE — GROUP NOTE
IP  GROUP DOCUMENTATION INDIVIDUAL                                                                          Group Therapy Note    Date: 4/28/2021    Group Start Time: 1300  Group End Time: 9996  Group Topic: Process Group - Inpatient    [facility]    Romi Wing LCSW    IP [address] [facility] GROUP DOCUMENTATION GROUP    Group Therapy Note    Pts participated in process group therapy. Pts began group by discussing how they were feeling, pts then were led in an open discussion of thoughts feelings and emotions regarding their mental health. Pts were encouraged to share and provide feedback to peers. Attendees: 4.9         Attendance: Attended    Patient's Goal:  Pt working towards goal of participating in group therapy. Interventions/techniques: Informed    Follows Directions: Followed directions    Interactions: Interacted appropriately    Mental Status: Flat    Behavior/appearance: Attentive, Cooperative and Withdrawn/quiet    Goals Achieved: Able to engage in interactions, Able to listen to others, Able to give feedback to another, Able to reflect/comment on own behavior, Able to manage/cope with feelings and Able to receive feedback      Additional Notes:  Pt participated in group therapy. 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 Pt stated that he would be alone and that he was apprehensive about returning. Pt stated that he was being dc but not for another year. Pt stated at the end that he wanted to continue to work through his mental health struggles.      Abdoulaye Taveras LCSW

## 2021-04-28 NOTE — SUICIDE SAFETY PLAN
SAFETY PLAN    A suicide Safety Plan is a document that supports someone when they are having thoughts of suicide. Warning Signs that indicate a suicidal crisis may be developing: What (situations, thoughts, feelings, body sensations, behaviors, etc.) do you experience that lets you know you are beginning to think about suicide? 1. Too many negative thoughts  2. Not thinking positive  3. Having anxiety about future    Internal Coping Strategies:  What things can I do (relaxation techniques, hobbies, physical activities, etc.) to take my mind off my problems without contacting another person? 1. Call someone  2. Video games  3. Going to gym/workout    People and social settings that provide distraction: Who can I call or where can I go to distract me? 1. Name: Sisi Toro  Phone: [phone]  2. Name: Rustam Chahal  Phone: [phone]   3. Place: going outside for a walk            4. Place: going to the gym    People whom I can ask for help: Who can I call when I need help - for example, friends, family, clergy, someone else? 1. Name: Kel Granger                Phone: [phone]  2. Name: Al  Phone: [phone]  3. Name: Barron Ramirez  Phone: [phone]    Professionals or [address] [place] [address] I can contact during a crisis: Who can I call for help - for example, my doctor, my psychiatrist, my psychologist, a mental health provider, a suicide hotline? 1. Clinician Name: MADI ROMERO [facility]   Phone: [phone]      Clinician Pager or Emergency Contact #:     2. Clinician Name: Robby Mendez   Phone: [phone]      Clinician Pager or Emergency Contact #:     3. Suicide Prevention Lifeline: 8-375-042-TALK (5105)    4. [address] [place] [address] Emergency Services -  for example, [facility] suicide hotline, [facility] Hotline: [facility]      Emergency Services Address:       Emergency Services Phone: [phone]    Making the environment safe:  How can I make my environment (house/apartment/living space) safer? For example, can I remove guns, medications, and other items? 1. Post safety plan  2.  Continue taking medications

## 2021-04-28 NOTE — BH NOTES
Behavioral Health Transition Record to Provider    Patient Name: Shellie Bradley  YOB: 2000  Medical Record Number: [id number]  Date of Admission: 4/14/2021  Date of Discharge: 4/28/21    Attending Provider: No att. providers found  Discharging Provider: Dr. Jayla Rosa  To contact this individual call [phone] and ask the  to page. If unavailable, ask to be transferred to [facility] Provider on call. [facility] Provider will be available on call 24/7 and during holidays. Primary Care Provider: Other, MD Sri    No Known Allergies    Reason for Admission: depression, SI    Admission Diagnosis: Major depressive disorder [F32.9]  Suicidal ideation [R45.851]    * No surgery found *    Results for orders placed or performed during the hospital encounter of 04/14/21   COVID-19 RAPID TEST   Result Value Ref Range    Specimen source Nasopharyngeal      COVID-19 rapid test Not Detected Not Detected     CBC WITH AUTOMATED DIFF   Result Value Ref Range    WBC 6.3 4.1 - 11.1 K/uL    RBC 4.34 4.10 - 5.70 M/uL    HGB 11.6 (L) 12.1 - 17.0 g/dL    HCT 35.8 (L) 36.6 - 50.3 %    MCV 82.5 80.0 - 99.0 FL    MCH 26.7 26.0 - 34.0 PG    MCHC 32.4 30.0 - 36.5 g/dL    RDW 13.8 11.5 - 14.5 %    PLATELET 114 313 - 859 K/uL    MPV 10.1 8.9 - 12.9 FL    NEUTROPHILS 67 32 - 75 %    LYMPHOCYTES 25 12 - 49 %    MONOCYTES 7 5 - 13 %    EOSINOPHILS 1 0 - 7 %    BASOPHILS 0 0 - 1 %    IMMATURE GRANULOCYTES 0 0.0 - 0.5 %    ABS. NEUTROPHILS 4.2 1.8 - 8.0 K/UL    ABS. LYMPHOCYTES 1.5 0.8 - 3.5 K/UL    ABS. MONOCYTES 0.4 0.0 - 1.0 K/UL    ABS. EOSINOPHILS 0.1 0.0 - 0.4 K/UL    ABS. BASOPHILS 0.0 0.0 - 0.1 K/UL    ABS. IMM.  GRANS. 0.0 0.00 - 0.04 K/UL    DF AUTOMATED     METABOLIC PANEL, COMPREHENSIVE   Result Value Ref Range    Sodium 140 136 - 145 mmol/L    Potassium 3.7 3.5 - 5.1 mmol/L    Chloride 108 97 - 108 mmol/L    CO2 28 21 - 32 mmol/L    Anion gap 4 (L) 5 - 15 mmol/L    Glucose 87 65 - 100 mg/dL BUN 12 6 - 20 mg/dL    Creatinine 0.68 (L) 0.70 - 1.30 mg/dL    BUN/Creatinine ratio 18 12 - 20      GFR est AA >60 >60 ml/min/1.73m2    GFR est non-AA >60 >60 ml/min/1.73m2    Calcium 8.8 8.5 - 10.1 mg/dL    Bilirubin, total 0.3 0.2 - 1.0 mg/dL    AST (SGOT) 17 15 - 37 U/L    ALT (SGPT) 30 12 - 78 U/L    Alk. phosphatase 68 45 - 117 U/L    Protein, total 7.4 6.4 - 8.2 g/dL    Albumin 4.0 3.5 - 5.0 g/dL    Globulin 3.4 2.0 - 4.0 g/dL    A-G Ratio 1.2 1.1 - 2.2     ETHYL ALCOHOL   Result Value Ref Range    ALCOHOL(ETHYL),SERUM <4 <10 mg/dL   DRUG SCREEN, URINE   Result Value Ref Range    AMPHETAMINES Negative Negative      BARBITURATES Negative Negative      BENZODIAZEPINES Negative Negative      COCAINE Negative Negative      METHADONE Negative Negative      OPIATES Negative Negative      PCP(PHENCYCLIDINE) Negative Negative      THC (TH-CANNABINOL) Negative Negative      Drug screen comment        This test is a screen for drugs of abuse in a medical setting only (i.e., they are unconfirmed results and as such must not be used for non-medical purposes, e.g.,employment testing, legal testing). Due to its inherent nature, false positive (FP) and false negative (FN) results may be obtained. Therefore, if necessary for medical care, recommend confirmation of positive findings by GC/MS.    URINALYSIS W/ REFLEX CULTURE    Specimen: Urine   Result Value Ref Range    Color Yellow/Straw      Appearance Clear Clear      Specific gravity 1.015 1.003 - 1.030      pH (UA) 7.0 5.0 - 8.0      Protein Negative Negative mg/dL    Glucose Negative Negative mg/dL    Ketone Negative Negative mg/dL    Bilirubin Negative Negative      Blood Negative Negative      Urobilinogen 0.1 0.1 - 1.0 EU/dL    Nitrites Negative Negative      Leukocyte Esterase Negative Negative      UA:UC IF INDICATED Culture not indicated by UA result Culture not indicated by UA result      WBC 0-4 0 - 4 /hpf    RBC 0-5 0 - 5 /hpf    Bacteria Negative Negative /hpf Immunizations administered during this encounter: There is no immunization history on file for this patient. Screening for Metabolic Disorders for Patients on Antipsychotic Medications  (Data obtained from the EMR)    Estimated Body Mass Index  Estimated body mass index is 26.58 kg/m² as calculated from the following:    Height as of this encounter: 5' 9\" (1.753 m). Weight as of this encounter: 81.6 kg (180 lb). Vital Signs/Blood Pressure  Visit Vitals  /60   Pulse (!) 49   Temp 97.7 °F (36.5 °C)   Resp 18   Ht 5' 9\" (1.753 m)   Wt 81.6 kg (180 lb)   SpO2 99%   BMI 26.58 kg/m²       Blood Glucose/Hemoglobin A1c  Lab Results   Component Value Date/Time    Glucose 87 04/14/2021 12:48 PM       No results found for: HBA1C, HGBE8, YAX5BVBQ     Lipid Panel  No results found for: CHOL, CHOLX, CHLST, CHOLV, 756413, HDL, HDLP, LDL, LDLC, DLDLP, TGLX, TRIGL, TRIGP, CHHD, CHHDX     Discharge Diagnosis: Major depressive disorder [F32.9]  Suicidal ideation [R45.851]    Discharge Plan: Pt will DC to [facility] and follow up with [facility] tomorrow at 0830. It has been recommended that pt be connected to PHP. Discharge Medication List and Instructions:   Discharge Medication List as of 4/28/2021  1:56 PM      START taking these medications    Details   buPROPion (WELLBUTRIN) 75 mg tablet Take 2 Tabs by mouth every morning. Indications: anxiousness associated with depression, Print, Disp-7 Tab, R-0      QUEtiapine (SEROquel) 25 mg tablet Take 1 Tab by mouth nightly. , Print, Disp-7 Tab, R-0      traZODone (DESYREL) 100 mg tablet Take 1 Tab by mouth nightly as needed for Sleep (For insomnia). , Print, Disp-7 Tab, R-0             Unresulted Labs (24h ago, onward)    None        To obtain results of studies pending at discharge, please contact [phone]    Follow-up Information     Follow up With Specialties Details Why Contact Info    Other, MD Sri    Patient can only remember the practice name and not the physician      [address] on 4/29/2021 Please arrive at 0830 for follow up [address] [place] [address]  [phone]          Advanced Directive:   Does the patient have an appointed surrogate decision maker? No  Does the patient have a Medical Advance Directive? No  Does the patient have a Psychiatric Advance Directive? No  If the patient does not have a surrogate or Medical Advance Directive AND Psychiatric Advance Directive, the patient was offered information on these advance directives Patient declined to complete    Patient Instructions: Please continue all medications until otherwise directed by physician. Tobacco Cessation Discharge Plan:   Is the patient a smoker and needs referral for smoking cessation? No  Patient referred to the following for smoking cessation with an appointment? Not applicable     Patient was offered medication to assist with smoking cessation at discharge? Not applicable  Was education for smoking cessation added to the discharge instructions? Not applicable    Alcohol/Substance Abuse Discharge Plan:   Does the patient have a history of substance/alcohol abuse and requires a referral for treatment? No  Patient referred to the following for substance/alcohol abuse treatment with an appointment? Not applicable  Patient was offered medication to assist with alcohol cessation at discharge? Not applicable  Was education for substance/alcohol abuse added to discharge instructions?  Not applicable    Patient discharged to Home; discussed with patient/caregiver

## 2021-04-28 NOTE — GROUP NOTE
MICHELL  GROUP DOCUMENTATION INDIVIDUAL                                                                          Group Therapy Note    Date: 4/28/2021    Group Start Time: 1100  Group End Time: 9920  Group Topic: Education Group - Inpatient    [facility]NAG[facility]    Dm CHELSIE Sanders    IP 1[address] [facility] GROUP DOCUMENTATION GROUP    Group Therapy Note  Pts participated in psycho education group therapy. Pts began group by answering check in question. Pts discussed how they were feeling today and one positive thing. Pts then went over safety plans, the importance of them and dicussed in depth each section of the safety plan. Pts were encouraged to give feedback and share responses. Attendees: 7/10         Attendance: Attended    Patient's Goal:  Pt working towards goal of attending group therapy     Interventions/techniques: Informed    Follows Directions: Followed directions    Interactions: Interacted appropriately    Mental Status: Flat    Behavior/appearance: Attentive and Cooperative    Goals Achieved: Discussed safety plan      Additional Notes:  Pt has already completed a safety plan however he shared his answers and his coping skills with the group. Pt was attentive and very interactive with his peers.      Akira San LCSW

## 2021-04-28 NOTE — PROGRESS NOTES
Problem: Depression-Palliative Care  Goal: *PALLIATIVE CARE-Alleviation of depression symptoms  4/28/2021 1422 by Mabel Del Toro RN  Outcome: Resolved/Met  4/28/2021 1254 by Mabel Del Toro RN  Outcome: Progressing Towards Goal     Problem: Depressed Mood (Adult/Pediatric)  Goal: *STG: Participates in treatment plan  4/28/2021 1422 by Mabel Del Toro RN  Outcome: Resolved/Met  4/28/2021 1254 by Mabel Del Toro RN  Outcome: Progressing Towards Goal  Goal: *STG: Participates in 1:1 therapy sessions  4/28/2021 1422 by Mabel Del Toro RN  Outcome: Resolved/Met  4/28/2021 1254 by Mabel Del Toro RN  Outcome: Progressing Towards Goal  Goal: *STG: Verbalizes anger, guilt, and other feelings in a constructive manor  4/28/2021 1422 by Mabel Del Toro RN  Outcome: Resolved/Met  4/28/2021 1254 by Mabel Del Toro RN  Outcome: Progressing Towards Goal  Goal: *STG: Attends activities and groups  4/28/2021 1422 by Mabel Del Toro RN  Outcome: Resolved/Met  4/28/2021 1254 by Mabel Del Toro RN  Outcome: Progressing Towards Goal  Goal: *STG: Demonstrates reduction in symptoms and increase in insight into coping skills/future focused  4/28/2021 1422 by Mabel Del Toro RN  Outcome: Resolved/Met  4/28/2021 1254 by Mabel Del Toro RN  Outcome: Progressing Towards Goal  Goal: *STG: Remains safe in hospital  4/28/2021 1422 by Mabel Del Toro RN  Outcome: Resolved/Met  4/28/2021 1254 by Mabel Del Toro RN  Outcome: Progressing Towards Goal  Goal: *STG: Complies with medication therapy  4/28/2021 1422 by Mabel Del Toro RN  Outcome: Resolved/Met  4/28/2021 1254 by Mabel Del Toro RN  Outcome: Progressing Towards Goal  Goal: *LTG: Returns to previous level of functioning and participates with after care plan  4/28/2021 1422 by Mabel Del Toro RN  Outcome: Resolved/Met  4/28/2021 1254 by Tuyet Christian RN  Outcome: Progressing Towards Goal  Goal: *LTG: Understands illness and can identify signs of relapse  4/28/2021 1422 by Tuyet Christian RN  Outcome: Resolved/Met  4/28/2021 1254 by Tuyet Christian RN  Outcome: Progressing Towards Goal  Goal: Interventions  4/28/2021 1422 by Tuyet Christian RN  Outcome: Resolved/Met  4/28/2021 1254 by Tuyet Christian RN  Outcome: Progressing Towards Goal     Problem: Patient Education: Go to Patient Education Activity  Goal: Patient/Family Education  4/28/2021 1422 by Tuyet Christian RN  Outcome: Resolved/Met  4/28/2021 1254 by Tuyet Christian RN  Outcome: Progressing Towards Goal     Problem: Anxiety  Goal: *Alleviation of anxiety  4/28/2021 1422 by Tuyet Christian RN  Outcome: Resolved/Met  4/28/2021 1254 by Tuyet Christian RN  Outcome: Progressing Towards Goal  Goal: *Alleviation of anxiety (Palliative Care)  4/28/2021 1422 by Tuyet Christian RN  Outcome: Resolved/Met  4/28/2021 1254 by Tuyet Christian RN  Outcome: Progressing Towards Goal     Problem: Patient Education: Go to Patient Education Activity  Goal: Patient/Family Education  4/28/2021 1422 by Tuyet Christian RN  Outcome: Resolved/Met  4/28/2021 1254 by Tuyet Christian RN  Outcome: Progressing Towards Goal     Problem: Suicide  Goal: *STG: Remains safe in hospital  4/28/2021 1422 by Tuyet Christian RN  Outcome: Resolved/Met  4/28/2021 1254 by Tuyet Christian RN  Outcome: Progressing Towards Goal  Goal: *STG: Seeks staff when feelings of self harm or harm towards others arise  4/28/2021 1422 by Tuyet Christian RN  Outcome: Resolved/Met  4/28/2021 1254 by Tuyet Christian RN  Outcome: Progressing Towards Goal  Goal: *STG: Attends activities and groups  4/28/2021 1422 by Tuyet Christian RN  Outcome: Resolved/Met  4/28/2021 1254 by Ehsan Ivory, RN  Outcome: Progressing Towards Goal  Goal: *STG:  Verbalizes alternative ways of dealing with maladaptive feelings/behaviors  4/28/2021 1422 by Ehsan Ivory, RN  Outcome: Resolved/Met  4/28/2021 1254 by Ehsan Ivory, RN  Outcome: Progressing Towards Goal  Goal: *STG/LTG: Complies with medication therapy  4/28/2021 1422 by Ehsan Ivory, RN  Outcome: Resolved/Met  4/28/2021 1254 by Ehsan Ivory, RN  Outcome: Progressing Towards Goal  Goal: *STG/LTG: No longer expresses self destructive or suicidal thoughts  4/28/2021 1422 by Ehsan Ivory, RN  Outcome: Resolved/Met  4/28/2021 1254 by Ehsan Ivory, RN  Outcome: Progressing Towards Goal  Goal: *LTG:  Identifies available community resources  4/28/2021 1422 by Ehsan Ivory, RN  Outcome: Resolved/Met  4/28/2021 1254 by Ehsan Ivory, RN  Outcome: Progressing Towards Goal  Goal: *LTG:  Develops proactive suicide prevention plan  4/28/2021 1422 by Ehsan Ivory, RN  Outcome: Resolved/Met  4/28/2021 1254 by Ehsan Ivory RN  Outcome: Progressing Towards Goal  Goal: Interventions  4/28/2021 1422 by Ehsan Ivory, RN  Outcome: Resolved/Met  4/28/2021 1254 by Ehsan Ivory, RN  Outcome: Progressing Towards Goal     Problem: Patient Education: Go to Patient Education Activity  Goal: Patient/Family Education  4/28/2021 1422 by Ehsan Ivory, RN  Outcome: Resolved/Met  4/28/2021 1254 by Ehsan Ivory RN  Outcome: Progressing Towards Goal     Problem: Falls - Risk of  Goal: *Absence of Falls  Description: Document [place] Fall Risk and appropriate interventions in the flowsheet.   4/28/2021 1422 by Ehsan Ivory, RN  Outcome: Resolved/Met  Note: Fall Risk Interventions:            Medication Interventions: Teach patient to arise slowly 4/28/2021 1254 by Penny Eli RN  Outcome: Progressing Towards Goal  Note: Fall Risk Interventions:            Medication Interventions: Teach patient to arise slowly

## 2021-04-28 NOTE — PROGRESS NOTES
Problem: Depressed Mood (Adult/Pediatric)  Goal: *STG: Verbalizes anger, guilt, and other feelings in a constructive manor  Outcome: Progressing Towards Goal  Goal: *STG: Remains safe in hospital  Outcome: Progressing Towards Goal  Goal: *STG: Complies with medication therapy  Outcome: Progressing Towards Goal     Problem: Suicide  Goal: *STG: Remains safe in hospital  Outcome: Progressing Towards Goal  Goal: *STG: Attends activities and groups  Outcome: Progressing Towards Goal  Goal: *STG/LTG: Complies with medication therapy  Outcome: Progressing Towards Goal  Goal: *STG/LTG: No longer expresses self destructive or suicidal thoughts  Outcome: Progressing Towards Goal

## 2021-04-28 NOTE — BH NOTES
Patient is quiet and withdrawn, very soft spoken. Pt does come out room and paces the molina doing laps. Pt requested to shave last night, so nurse monitored patient with razor and removed and disposed of razor appropriately. Pt says that he has to shave before going back to [place] Wednesday upon discharge. He gives his depression a 3/10. He denies anxiety, SI, HI, and hallucinations. Pt was med compliant and requested a prn Trazodone for sleep. Pt has appeared to be sleeping well, with regular and unlabored respirations. No distress noted. Will continue to monitor patient every 15 mins as per unit protocol.

## 2021-04-28 NOTE — PROGRESS NOTES
Follow up visit attempted for pt in the [address] [place] [address] unit for patient request for follow visit per previous visit. Staff stated [facility] has been discharged just a short while ago. I provided silent prayer. Chaplains will follow up if further referrals are requested. Chaplain Tay Pedersen M.Div.    can be reached by calling the  at [facility]  [phone]

## 2021-04-28 NOTE — BH NOTES
Behavioral Health Treatment Team Note     Patient goal(s) for today: \"continue going to groups, get ready for discharge\"  Treatment team focus/goals: medication management, group therapy, DC planning    Progress note: Pt presented with flat affect and reported feeling \"alright\". Pt denied active SI HI and AVH, and reported he feels ready for DC tomorrow. Pt declined family session and reported he has been talking to them on his own. Writer encouraged again, but pt declined. Pt and writer spoke about DC plans and PHP.      LOS:  13  Expected LOS: 14  Insurance info/prescription coverage:  /BSHSI  [facility]   Date of last family contact:  4/26/21  Family requesting physician contact today:  no  Discharge plan:  Pt will discharge to [facility] and follow up with outpatient treatment   Guns in the home:  no   Outpatient provider(s):  [facility]    Participating treatment team members: Kat Montero, * (assigned SW), Francisco Luevano LMSW

## 2021-05-30 NOTE — DISCHARGE SUMMARY
Anaid Christensen 23 SUMMARY    Name:  Dina Mansfield  MR#:  [id number]  :  2000  ACCOUNT #:  [de-identified]  ADMIT DATE:  2021  DISCHARGE DATE:  2021    Please make reference to my initial psychiatric H and P.    HISTORY OF PRESENT ILLNESS:  This is a 15-year-old single  male patient, a marine student, admitted to [address] [place] [address] Unit voluntarily from [facility] ED, stating the patient had attempted to hang himself two days prior to admission and told his platoon sergeant, who brought him today. The patient was admitted with suicidal ideation. Denied homicidal ideation, willing to tell the staff if he is afraid of losing control. The patient joined [address] [place] [address] as a student six months ago. He wanted to go as a medic, but he did not have enough score. He is in culinary service. He is not happy. He was not sure whether he wanted to stay in [address] [place] [address] or not. He preferred to leave. He has decreased sleep, decreased energy and motivation, and increased appetite. Apparently, he was at [facility] and left, quickly decompensated. PAST HISTORY:  No prior civilian treatment. FAMILY HISTORY:  Mother has depression. TRAUMA:  He says he was bullied in school. Some abuse. Denies alcohol abuse, drug abuse, and medical problems. PHYSICAL EXAMINATION:  Vital signs at the time of discharge; temperature 97.7, pulse 49, blood pressure 104/60, respirations 18. LABORATORY DATA:  CBC unremarkable. Metabolic panel unremarkable. Blood alcohol less than 4, and COVID not detected. Drug screen negative. COURSE AND TREATMENT DURING THE HOSPITALIZATION:  Rather, uneven. Remained depressed, dysphoric, unhappy for a long time.   Very much preoccupied about discharge from the [address] [place] [address], what would be his diagnosis, would he have any residual.  The patient received medications and had a session with the commanding officer and stabilized and not suicidal anymore. Not homicidal.  Does not have access to weapons. Done a safety plan. He has been discharged. DISCHARGE DIAGNOSES:  Adjustment disorder with depressed mood; history of suicidal ideation, plan to hang himself, resolved. DISCHARGE MEDICATIONS:  Trazodone 100 mg p.r.n. for insomnia, Seroquel 25 mg at night, bupropion 75 mg two tablets in the morning. All medications only a week's supply was given, no refills. Discharge as improved. Follow up the next day in the morning at [facility].         Lazara Mai MD      [id number]  D:  05/29/2021 23:41  T:  05/30/2021 7:23  JOB #:  [id number]

## 2022-03-18 PROBLEM — R45.851 SUICIDAL IDEATION: Status: ACTIVE | Noted: 2021-04-14

## 2022-03-19 PROBLEM — F32.9 MAJOR DEPRESSIVE DISORDER: Status: ACTIVE | Noted: 2021-04-14

## 2023-05-14 RX ORDER — BUPROPION HYDROCHLORIDE 75 MG/1
150 TABLET ORAL
COMMUNITY
Start: 2021-04-29

## 2023-05-14 RX ORDER — TRAZODONE HYDROCHLORIDE 100 MG/1
100 TABLET ORAL
COMMUNITY
Start: 2021-04-28

## 2023-05-14 RX ORDER — QUETIAPINE FUMARATE 25 MG/1
25 TABLET, FILM COATED ORAL
COMMUNITY
Start: 2021-04-28

## 2023-12-09 NOTE — BH NOTES
DISCHARGE SUMMARY    NAME:Guy Vargas  : 2000  MRN: [id number]    The patient Hoa Lara exhibits the ability to control behavior in a less restrictive environment. Patient's level of functioning is improving. No assaultive/destructive behavior has been observed for the past 24 hours. No suicidal/homicidal threat or behavior has been observed for the past 24 hours. There is no evidence of serious medication side effects. Patient has not been in physical or protective restraints for at least the past 24 hours. If weapons involved, how are they secured? No weapons unless monitored through training    Is patient aware of and in agreement with discharge plan? yes    Arrangements for medication:  Prescriptions given to patient, given a weeks supply or 30 day supply. Copy of discharge instructions to provider?:  yes    Arrangements for transportation home:  yes    Keep all follow up appointments as scheduled, continue to take prescribed medications per physician instructions.   Mental health crisis number:  349 or your local mental health crisis line number at [address] [facility] Emergency WARM LINE      9-206-858-MHAV ([phone])      M-F: 9am to 9pm      Sat & Sun: 5pm  9pm  National suicide prevention lines:                             5-568-HWGHOLA (2-212-479-694-724-6365)       3-554-921-TALK ([phone])    Crisis Text Line:  Text HOME to [phone] no diabetes and no thyroid trouble.

## 2024-03-22 NOTE — BH NOTES
----- Message from Leo Fallon sent at 3/22/2024  8:56 AM CDT -----  Contact: [phone]@[email or web address]  Pharmacy is calling to clarify an RX.yes   RX name: traMADoL (ULTRAM) 50 mg tablet   What do they need to clarify:  need to know if its chronic and the treatment plan   Comments: Please give [facility] a call back [phone]     Pt. Encouraged to attend group but did not attend.   Leisure skills group
